# Patient Record
Sex: MALE | Race: WHITE | NOT HISPANIC OR LATINO | Employment: OTHER | URBAN - METROPOLITAN AREA
[De-identification: names, ages, dates, MRNs, and addresses within clinical notes are randomized per-mention and may not be internally consistent; named-entity substitution may affect disease eponyms.]

---

## 2017-02-13 ENCOUNTER — OFFICE VISIT (OUTPATIENT)
Dept: FAMILY MEDICINE | Facility: CLINIC | Age: 52
End: 2017-02-13

## 2017-02-13 VITALS
DIASTOLIC BLOOD PRESSURE: 83 MMHG | SYSTOLIC BLOOD PRESSURE: 134 MMHG | HEART RATE: 66 BPM | WEIGHT: 228 LBS | TEMPERATURE: 97.2 F | BODY MASS INDEX: 34.56 KG/M2 | OXYGEN SATURATION: 97 % | HEIGHT: 68 IN

## 2017-02-13 DIAGNOSIS — F90.9 ATTENTION DEFICIT HYPERACTIVITY DISORDER (ADHD), UNSPECIFIED ADHD TYPE: ICD-10-CM

## 2017-02-13 PROCEDURE — 99213 OFFICE O/P EST LOW 20 MIN: CPT | Performed by: FAMILY MEDICINE

## 2017-02-13 RX ORDER — DEXTROAMPHETAMINE SACCHARATE, AMPHETAMINE ASPARTATE, DEXTROAMPHETAMINE SULFATE AND AMPHETAMINE SULFATE 7.5; 7.5; 7.5; 7.5 MG/1; MG/1; MG/1; MG/1
30 TABLET ORAL DAILY
Qty: 30 TABLET | Refills: 0 | Status: SHIPPED | OUTPATIENT
Start: 2017-02-13 | End: 2018-05-01

## 2017-02-13 RX ORDER — DEXTROAMPHETAMINE SACCHARATE, AMPHETAMINE ASPARTATE, DEXTROAMPHETAMINE SULFATE AND AMPHETAMINE SULFATE 7.5; 7.5; 7.5; 7.5 MG/1; MG/1; MG/1; MG/1
30 TABLET ORAL DAILY
Qty: 30 TABLET | Refills: 0 | Status: SHIPPED | OUTPATIENT
Start: 2017-04-13 | End: 2018-05-04

## 2017-02-13 RX ORDER — DEXTROAMPHETAMINE SACCHARATE, AMPHETAMINE ASPARTATE, DEXTROAMPHETAMINE SULFATE AND AMPHETAMINE SULFATE 7.5; 7.5; 7.5; 7.5 MG/1; MG/1; MG/1; MG/1
30 TABLET ORAL DAILY
Qty: 30 TABLET | Refills: 0 | Status: SHIPPED | OUTPATIENT
Start: 2017-03-13 | End: 2018-05-01

## 2017-02-13 NOTE — MR AVS SNAPSHOT
"              After Visit Summary   2/13/2017    El Singh    MRN: [id number]           Patient Information     Date Of Birth          1965        Visit Information        Provider Department      2/13/2017 11:30 AM Jose Willson MD [facility]        Today's Diagnoses     Attention deficit hyperactivity disorder (ADHD), unspecified ADHD type          Care Instructions    Let me see you in 3-5 months for a physical with pre-visit labs.        Follow-ups after your visit        Who to contact     If you have questions or need follow up information about today's clinic visit or your schedule please contact [facility] directly at [phone].  Normal or non-critical lab and imaging results will be communicated to you by MyChart, letter or phone within 4 business days after the clinic has received the results. If you do not hear from us within 7 days, please contact the clinic through [employer] or phone. If you have a critical or abnormal lab result, we will notify you by phone as soon as possible.  Submit refill requests through [employer] or call your pharmacy and they will forward the refill request to us. Please allow 3 business days for your refill to be completed.          Additional Information About Your Visit        MyChart Information     [employer] gives you secure access to your electronic health record. If you see a primary care provider, you can also send messages to your care team and make appointments. If you have questions, please call your primary care clinic.  If you do not have a primary care provider, please call [phone] and they will assist you.        Care EveryWhere ID     This is your Care EveryWhere ID. This could be used by other organizations to access your [facility] medical records  [id number]        Your Vitals Were     Pulse Temperature Height Pulse Oximetry BMI (Body Mass Index)       66 97.2  F (36.2  C) (Oral) 5' 8.25\" (1.734 m) 97% 34.41 kg/m2  "       Blood Pressure from Last 3 Encounters:   02/13/17 134/83   01/21/16 128/86   11/11/15 122/88    Weight from Last 3 Encounters:   02/13/17 228 lb (103.4 kg)   01/21/16 217 lb (98.4 kg)   11/11/15 215 lb (97.5 kg)              Today, you had the following     No orders found for display         Where to get your medicines      Some of these will need a paper prescription and others can be bought over the counter.  Ask your nurse if you have questions.     Bring a paper prescription for each of these medications     amphetamine-dextroamphetamine 30 MG per tablet    amphetamine-dextroamphetamine 30 MG per tablet    amphetamine-dextroamphetamine 30 MG per tablet          Primary Care Provider Office Phone # Fax #    Jose Willson [phone]       [facility] [address] [facility] [id number]        Thank you!     Thank you for choosing [facility] [place]  for your care. Our goal is always to provide you with excellent care. Hearing back from our patients is one way we can continue to improve our services. Please take a few minutes to complete the written survey that you may receive in the mail after your visit with us. Thank you!             Your Updated Medication List - Protect others around you: Learn how to safely use, store and throw away your medicines at www.disposemymeds.org.          This list is accurate as of: 2/13/17 12:02 PM.  Always use your most recent med list.                   Brand Name Dispense Instructions for use    * amphetamine-dextroamphetamine 30 MG per tablet    ADDERALL    30 tablet    Take 1 tablet (30 mg) by mouth daily       * amphetamine-dextroamphetamine 30 MG per tablet   Start taking on:  3/13/2017    ADDERALL    30 tablet    Take 1 tablet (30 mg) by mouth daily       * amphetamine-dextroamphetamine 30 MG per tablet   Start taking on:  4/13/2017    ADDERALL    30 tablet    Take 1 tablet (30 mg) by mouth daily       MULTI-VITAMIN PO       Take  by mouth.       * Notice:  This list has 3 medication(s) that are the same as other medications prescribed for you. Read the directions carefully, and ask your doctor or other care provider to review them with you.

## 2017-02-13 NOTE — NURSING NOTE
"Chief Complaint   Patient presents with     A.D.H.D       Initial /83 (Cuff Size: Adult Large)  Pulse 66  Temp 97.2  F (36.2  C) (Oral)  Ht 5' 8.25\" (1.734 m)  Wt 228 lb (103.4 kg)  SpO2 97%  BMI 34.41 kg/m2 Estimated body mass index is 34.41 kg/(m^2) as calculated from the following:    Height as of this encounter: 5' 8.25\" (1.734 m).    Weight as of this encounter: 228 lb (103.4 kg).  Medication Reconciliation: complete    Radha Feliz LPN    "

## 2017-02-13 NOTE — PROGRESS NOTES
HPI:    Brent is a 52 year old male here for follow-up:    ADHD - dx is based on evaluation by Sarita Beaver LP on 3/11, 3/18 and 3/27/13. Symptoms are poor his attention, focus, easy distractibility, insomnia and mood swings.   Treatment:   Adderall 30 mg qd (we started out with XR but he preferred short acting) - has not been taking it lately.     Insomnia - stable on Melatonin prn.    Shoulder and hip pain - following with ortho. Injections and PT have been helpful.     Overweight now Obese - Previously declined nutrition referral. Discussed diet and exercise.    Body mass index is 34.41 kg/(m^2).      Wt Readings from Last 5 Encounters:   02/13/17 228 lb (103.4 kg)   01/21/16 217 lb (98.4 kg)   11/11/15 215 lb (97.5 kg)   08/13/15 215 lb (97.5 kg)   04/15/15 215 lb (97.5 kg)     Wt Readings from Last 5 Encounters:    08/13/15  215 lb (97.523 kg)    04/15/15  215 lb (97.523 kg)    09/15/14  218 lb (98.884 kg)    04/15/14  211 lb (95.709 kg)    11/26/13  209 lb (94.802 kg)      Family history of blood clots and factor V leiden mutation - see FH. No personal history of blood clots. Negative for factor V leiden mutation on 4/15/14. Takes ASA 81 mg qd.     Vitamin D insufficiency - Vit D 2/27/13 showed borderline low level. He is not consistent with taking over the counter Vit D 1000 units daily.     Preventive:     Immunization History   Administered Date(s) Administered     Influenza (IIV3) 09/21/2010     Influenza (intradermal) 03/06/2013     Influenza Vaccine IM 3yrs+ 4 Valent IIV4 11/26/2013     TD (ADULT, 7+) 01/16/2006     TDAP (ADACEL AGES 11-64) 03/06/2013     Lipids:   Recent Labs    Lab Test  04/15/15  0941  04/15/14  0952    CHOL  244*  234*    HDL  74  67    LDL  155*  147*    TRIG  77  101    CHOLHDLRATIO  3.3  3.5      Recent Labs     Lab Test   04/15/14  0952   02/27/13  0732     CHOL   234*   164     HDL   67   49     LDL   147*   99     TRIG   101   79     CHOLHDLRATIO   3.5   3.3       Diabetes  "screen:     Last Basic Metabolic Panel:   4/15/2015   POTASSIUM 4.0 4/15/2015  CHLORIDE 104 4/15/2015  PILI 9.0 4/15/2015  CO2 27 4/15/2015  BUN 17 4/15/2015  CR 0.86 4/15/2015  GLC 92 4/15/2015    Colonoscopy: 5/7/14 - repeat in 10 years  Hep C: Neg 4/15/14    STD screen - declines    Screening for prostate cancer - discussed previously controversy.    PSA   Date Value Ref Range Status   04/15/2015 0.92 0 - 4 ug/L Final       ROS:    Const: No fevers or night sweats recently.  ENT: No runny nose, sore throat or ear pain.  Resp: No cough or shortness of breath.  CV: No chest pain, dizziness or cardiac palpitations.  GI: No nausea, vomiting, diarrhea or constipation. Denies blood in stools or black stools.  : No dysuria, frequency or hematuria.      SH:    Marital status:   Kids: 4  Employment: construction and real estate  Exercise: no  Tobacco: no  Etoh: no  Recreational drugs: no  Caffeine: one per day    FH:    Dad had PE and had factor V leiden. Cousin had blood clot in her legs and had factor V leiden.     Exam:    /83 (Cuff Size: Adult Large)  Pulse 66  Temp 97.2  F (36.2  C) (Oral)  Ht 5' 8.25\" (1.734 m)  Wt 228 lb (103.4 kg)  SpO2 97%  BMI 34.41 kg/m2    Gen: Healthy appearing male in no acute distress  Psych: Affect is normal. Speech is fluent. Thought logical. Insight and judgement seem to be intact. Denies SI or HI.    Assessment and Plan - Decision Making    1. Attention deficit hyperactivity disorder (ADHD), unspecified ADHD type   checked and no concerns. Refilled Adderall.  - amphetamine-dextroamphetamine (ADDERALL) 30 MG per tablet; Take 1 tablet (30 mg) by mouth daily  Dispense: 30 tablet; Refill: 0  - amphetamine-dextroamphetamine (ADDERALL) 30 MG per tablet; Take 1 tablet (30 mg) by mouth daily  Dispense: 30 tablet; Refill: 0  - amphetamine-dextroamphetamine (ADDERALL) 30 MG per tablet; Take 1 tablet (30 mg) by mouth daily  Dispense: 30 tablet; Refill: 0      Written " instructions given as follows:    Patient Instructions   Let me see you in 3-5 months for a physical with pre-visit labs.

## 2018-05-04 ENCOUNTER — OFFICE VISIT (OUTPATIENT)
Dept: FAMILY MEDICINE | Facility: CLINIC | Age: 53
End: 2018-05-04
Payer: COMMERCIAL

## 2018-05-04 VITALS
WEIGHT: 225 LBS | TEMPERATURE: 97.8 F | SYSTOLIC BLOOD PRESSURE: 120 MMHG | DIASTOLIC BLOOD PRESSURE: 86 MMHG | HEART RATE: 67 BPM | BODY MASS INDEX: 34.1 KG/M2 | HEIGHT: 68 IN | RESPIRATION RATE: 14 BRPM | OXYGEN SATURATION: 97 %

## 2018-05-04 DIAGNOSIS — F90.9 ATTENTION DEFICIT HYPERACTIVITY DISORDER (ADHD), UNSPECIFIED ADHD TYPE: ICD-10-CM

## 2018-05-04 DIAGNOSIS — Z00.00 ROUTINE GENERAL MEDICAL EXAMINATION AT A HEALTH CARE FACILITY: Primary | ICD-10-CM

## 2018-05-04 LAB
ANION GAP SERPL CALCULATED.3IONS-SCNC: 7 MMOL/L (ref 3–14)
BUN SERPL-MCNC: 18 MG/DL (ref 7–30)
CALCIUM SERPL-MCNC: 8.7 MG/DL (ref 8.5–10.1)
CHLORIDE SERPL-SCNC: 106 MMOL/L (ref 94–109)
CHOLEST SERPL-MCNC: 206 MG/DL
CO2 SERPL-SCNC: 28 MMOL/L (ref 20–32)
CREAT SERPL-MCNC: 0.96 MG/DL (ref 0.66–1.25)
GFR SERPL CREATININE-BSD FRML MDRD: 82 ML/MIN/1.7M2
GLUCOSE SERPL-MCNC: 96 MG/DL (ref 70–99)
HDLC SERPL-MCNC: 64 MG/DL
LDLC SERPL CALC-MCNC: 125 MG/DL
NONHDLC SERPL-MCNC: 142 MG/DL
POTASSIUM SERPL-SCNC: 4.4 MMOL/L (ref 3.4–5.3)
PSA SERPL-ACNC: 0.93 UG/L (ref 0–4)
SODIUM SERPL-SCNC: 141 MMOL/L (ref 133–144)
TRIGL SERPL-MCNC: 87 MG/DL

## 2018-05-04 PROCEDURE — 80061 LIPID PANEL: CPT | Performed by: FAMILY MEDICINE

## 2018-05-04 PROCEDURE — 36415 COLL VENOUS BLD VENIPUNCTURE: CPT | Performed by: FAMILY MEDICINE

## 2018-05-04 PROCEDURE — 80048 BASIC METABOLIC PNL TOTAL CA: CPT | Performed by: FAMILY MEDICINE

## 2018-05-04 PROCEDURE — 99396 PREV VISIT EST AGE 40-64: CPT | Performed by: FAMILY MEDICINE

## 2018-05-04 PROCEDURE — G0103 PSA SCREENING: HCPCS | Performed by: FAMILY MEDICINE

## 2018-05-04 RX ORDER — DEXTROAMPHETAMINE SACCHARATE, AMPHETAMINE ASPARTATE, DEXTROAMPHETAMINE SULFATE AND AMPHETAMINE SULFATE 7.5; 7.5; 7.5; 7.5 MG/1; MG/1; MG/1; MG/1
30 TABLET ORAL DAILY
Qty: 30 TABLET | Refills: 0 | Status: SHIPPED | OUTPATIENT
Start: 2018-07-04 | End: 2019-10-29

## 2018-05-04 RX ORDER — DEXTROAMPHETAMINE SACCHARATE, AMPHETAMINE ASPARTATE, DEXTROAMPHETAMINE SULFATE AND AMPHETAMINE SULFATE 7.5; 7.5; 7.5; 7.5 MG/1; MG/1; MG/1; MG/1
30 TABLET ORAL DAILY
Qty: 30 TABLET | Refills: 0 | Status: SHIPPED | OUTPATIENT
Start: 2018-05-04 | End: 2019-10-29

## 2018-05-04 RX ORDER — DEXTROAMPHETAMINE SACCHARATE, AMPHETAMINE ASPARTATE, DEXTROAMPHETAMINE SULFATE AND AMPHETAMINE SULFATE 7.5; 7.5; 7.5; 7.5 MG/1; MG/1; MG/1; MG/1
30 TABLET ORAL DAILY
Qty: 30 TABLET | Refills: 0 | Status: SHIPPED | OUTPATIENT
Start: 2018-06-04 | End: 2019-10-29

## 2018-05-04 NOTE — MR AVS SNAPSHOT
After Visit Summary   5/4/2018    El Singh    MRN: [id number]           Patient Information     Date Of Birth          1965        Visit Information        Provider Department      5/4/2018 8:10 AM Jose Willson MD [facility]        Today's Diagnoses     Routine general medical examination at a health care facility    -  1    Attention deficit hyperactivity disorder (ADHD), unspecified ADHD type          Care Instructions    1. I recommend including veggies, fresh fruits (3 to 5 servings), nuts (unsalted) in your daily diet. Cut down on carbs like bread, pasta, rice, potatoes. Cut down on red meats like burgers etc. Increase healthy proteins like beans, tofu, tuna, chicken and turkey.    2. Get regular exercise like jogging, biking, swimming at least 3 times per week.      3. Avoid the sun or otherwise use sun screen - please look at the insert I gave you about melanoma.    4. See the dentist at least once per year. Eye doctor every 2 years.    5. I will contact you via My Chart about your test results.     6. If all is well, see you in one year for a physical with fasting labs.          Follow-ups after your visit        Who to contact     If you have questions or need follow up information about today's clinic visit or your schedule please contact [facility] directly at [phone].  Normal or non-critical lab and imaging results will be communicated to you by MyChart, letter or phone within 4 business days after the clinic has received the results. If you do not hear from us within 7 days, please contact the clinic through MyChart or phone. If you have a critical or abnormal lab result, we will notify you by phone as soon as possible.  Submit refill requests through [employer] or call your pharmacy and they will forward the refill request to us. Please allow 3 business days for your refill to be completed.          Additional Information About Your Visit    "     MyChart Information     [employer] gives you secure access to your electronic health record. If you see a primary care provider, you can also send messages to your care team and make appointments. If you have questions, please call your primary care clinic.  If you do not have a primary care provider, please call [phone] and they will assist you.        Care EveryWhere ID     This is your Care EveryWhere ID. This could be used by other organizations to access your [facility] medical records  [id number]        Your Vitals Were     Pulse Temperature Respirations Height Pulse Oximetry BMI (Body Mass Index)    67 97.8  F (36.6  C) (Oral) 14 5' 8.25\" (1.734 m) 97% 33.96 kg/m2       Blood Pressure from Last 3 Encounters:   05/04/18 120/86   02/13/17 134/83   01/21/16 128/86    Weight from Last 3 Encounters:   05/04/18 225 lb (102.1 kg)   02/13/17 228 lb (103.4 kg)   01/21/16 217 lb (98.4 kg)              We Performed the Following     Basic metabolic panel     Lipid panel reflex to direct LDL Fasting     Prostate spec antigen screen          Where to get your medicines      Some of these will need a paper prescription and others can be bought over the counter.  Ask your nurse if you have questions.     Bring a paper prescription for each of these medications     amphetamine-dextroamphetamine 30 MG per tablet    amphetamine-dextroamphetamine 30 MG per tablet    amphetamine-dextroamphetamine 30 MG per tablet          Primary Care Provider Office Phone # Fax #    Jose Willson [phone] [address] [address] [facility] [address]        Equal Access to Services     [facility]: Hadii aad ku hadasho Soomayra, waaxda luqadaha, qaybta kaalmada anton harris . So [facility] [place] [facility] [phone].    ATENCIÓN: Si habla español, tiene a wagoner disposición servicios gratuitos de asistencia lingüística. Llame al [phone].    We comply with applicable federal civil rights laws and " [place] laws. We do not discriminate on the basis of race, color, national origin, age, disability, sex, sexual orientation, or gender identity.            Thank you!     [facility]. Our goal is always to provide you with excellent care. Hearing back from our patients is one way we can continue to improve our services. Please take a few minutes to complete the written survey that you may receive in the mail after your visit with us. Thank you!             Your Updated Medication List - Protect others around you: Learn how to safely use, store and throw away your medicines at www.disposemymeds.org.          This list is accurate as of 5/4/18  8:59 AM.  Always use your most recent med list.                   Brand Name Dispense Instructions for use Diagnosis    * amphetamine-dextroamphetamine 30 MG per tablet    ADDERALL    30 tablet    Take 1 tablet (30 mg) by mouth daily    Attention deficit hyperactivity disorder (ADHD), unspecified ADHD type       * amphetamine-dextroamphetamine 30 MG per tablet   Start taking on:  6/4/2018    ADDERALL    30 tablet    Take 1 tablet (30 mg) by mouth daily    Attention deficit hyperactivity disorder (ADHD), unspecified ADHD type       * amphetamine-dextroamphetamine 30 MG per tablet   Start taking on:  7/4/2018    ADDERALL    30 tablet    Take 1 tablet (30 mg) by mouth daily    Attention deficit hyperactivity disorder (ADHD), unspecified ADHD type       MULTI-VITAMIN PO      Take  by mouth.        * Notice:  This list has 3 medication(s) that are the same as other medications prescribed for you. Read the directions carefully, and ask your doctor or other care provider to review them with you.

## 2018-05-04 NOTE — NURSING NOTE
"Chief Complaint   Patient presents with     Physical       Initial BP (!) 149/99 (Cuff Size: Adult Large)  Pulse 67  Temp 97.8  F (36.6  C) (Oral)  Resp 14  Ht 5' 8.25\" (1.734 m)  Wt 225 lb (102.1 kg)  SpO2 97%  BMI 33.96 kg/m2 Estimated body mass index is 33.96 kg/(m^2) as calculated from the following:    Height as of this encounter: 5' 8.25\" (1.734 m).    Weight as of this encounter: 225 lb (102.1 kg).      Radha Feliz LPN    "

## 2018-05-04 NOTE — PATIENT INSTRUCTIONS
1. I recommend including veggies, fresh fruits (3 to 5 servings), nuts (unsalted) in your daily diet. Cut down on carbs like bread, pasta, rice, potatoes. Cut down on red meats like burgers etc. Increase healthy proteins like beans, tofu, tuna, chicken and turkey.    2. Get regular exercise like jogging, biking, swimming at least 3 times per week.      3. Avoid the sun or otherwise use sun screen - please look at the insert I gave you about melanoma.    4. See the dentist at least once per year. Eye doctor every 2 years.    5. I will contact you via My Chart about your test results.     6. If all is well, see you in one year for a physical with fasting labs.

## 2018-05-04 NOTE — PROGRESS NOTES
SUBJECTIVE:   CC: El Singh is an 53 year old male who presents for preventative health visit.     Physical   Annual:     Getting at least 3 servings of Calcium per day::  NO    Bi-annual eye exam::  NO    Dental care twice a year::  Yes    Sleep apnea or symptoms of sleep apnea::  None    Frequency of exercise::  1 day/week    Duration of exercise::  15-30 minutes    Taking medications regularly::  Not Applicable    Additional concerns today::  YES            ADHD - Symptoms are poor his attention, focus, easy distractibility, insomnia and mood swings.   Evaluation and treatment:    dx is based on evaluation by [facility] on 3/11, 3/18 and 3/27/13.    Adderall 30 mg qd (we started out with XR but he preferred short acting) - takes it only as needed so it lasts him a long time.    Insomnia - stable on Melatonin prn.    Shoulder and hip pain - following with ortho. Injections and PT have been helpful.     Dyslipidemia - No history of CAD, CVA, PAD or diabetes.   Evaluation and treatment:    Not on any meds.   Check fasting lipids and go from there.    Recent Labs   Lab Test  04/15/15   0941  04/15/14   0952   CHOL  244*  234*   HDL  74  67   LDL  155*  147*   TRIG  77  101   CHOLHDLRATIO  3.3  3.5     Sleep apnea -   Evaluation and treatment:    He did not tolerate CPAP    Overweight now Obese -   Evaluation and treatment:    Previously declined nutrition referral.    Discussed diet and exercise.   He plans to start exercising.    Body mass index is 33.96 kg/(m^2).      Wt Readings from Last 5 Encounters:   05/04/18 225 lb (102.1 kg)   02/13/17 228 lb (103.4 kg)   01/21/16 217 lb (98.4 kg)   11/11/15 215 lb (97.5 kg)   08/13/15 215 lb (97.5 kg)     Wt Readings from Last 5 Encounters:    08/13/15  215 lb (97.523 kg)    04/15/15  215 lb (97.523 kg)    09/15/14  218 lb (98.884 kg)    04/15/14  211 lb (95.709 kg)    11/26/13  209 lb (94.802 kg)      Family history of blood clots and factor V leiden mutation -  see FH. No personal history of blood clots. Negative for factor V leiden mutation on 4/15/14. Takes ASA 81 mg qd.     Vitamin D insufficiency - Vit D 2/27/13 showed borderline low level. He is not consistent with taking over the counter Vit D 1000 units daily    comedone - on left lower lip. Present for over one month.  Evaluation and treatment:    I offered to remove using tip of a needle.   But he would rather observe it.    Preventive: advised to get Shingrix at our pharmacy.     Immunization History   Administered Date(s) Administered     Influenza (IIV3) PF 09/21/2010     Influenza (intradermal) 03/06/2013     Influenza Vaccine IM 3yrs+ 4 Valent IIV4 11/26/2013     TD (ADULT, 7+) 01/16/2006     TDAP Vaccine (Adacel) 03/06/2013     Diabetes screen: ordered today.    Colonoscopy: 5/7/14 - repeat in 10 years    Hep C: Neg 4/15/14    STD screen - declines    Screening for prostate cancer - discussed controversy. PSA ordered.    PSA   Date Value Ref Range Status   04/15/2015 0.92 0 - 4 ug/L Final       SH:    Marital status:   Kids: 4  Employment: construction and real estate  Exercise: no  Tobacco: no  Etoh: no  Recreational drugs: no  Caffeine: one per day    FH:    Dad had PE and had factor V leiden. Cousin had blood clot in her legs and had factor V leiden.       Today's PHQ-2 Score:   PHQ-2 ( 1999 Pf[employer]) 5/4/2018   Q1: Little interest or pleasure in doing things 0   Q2: Feeling down, depressed or hopeless 0   PHQ-2 Score 0   Q1: Little interest or pleasure in doing things Not at all   Q2: Feeling down, depressed or hopeless Not at all   PHQ-2 Score 0       Abuse: Current or Past(Physical, Sexual or Emotional)- No  Do you feel safe in your environment - Yes    Social History   Substance Use Topics     Smoking status: Never Smoker     Smokeless tobacco: Never Used     Alcohol use No      Comment: quit      Alcohol Use 5/4/2018   If you drink alcohol do you typically have greater than 3 drinks per  day OR greater than 7 drinks per week? Yes   AUDIT SCORE  5     AUDIT - Alcohol Use Disorders Identification Test - Reproduced from the World Health Organization Audit 2001 (Second Edition) 5/4/2018   1.  How often do you have a drink containing alcohol? 2 to 3 times a week   2.  How many drinks containing alcohol do you have on a typical day when you are drinking? 1 or 2   3.  How often do you have five or more drinks on one occasion? Less than monthly   4.  How often during the last year have you found that you were not able to stop drinking once you had started? Never   5.  How often during the last year have you failed to do what was normally expected of you because of drinking? Never   6.  How often during the last year have you needed a first drink in the morning to get yourself going after a heavy drinking session? Never   7.  How often during the last year have you had a feeling of guilt or remorse after drinking? Never   8.  How often during the last year have you been unable to remember what happened the night before because of your drinking? Less than monthly   9.  Have you or someone else been injured because of your drinking? No   10. Has a relative, friend, doctor or other health care worker been concerned about your drinking or suggested you cut down? No   TOTAL SCORE 5       Last PSA:   PSA   Date Value Ref Range Status   04/15/2015 0.92 0 - 4 ug/L Final           Review of Systems  C: NEGATIVE for fever, chills, change in weight  I: NEGATIVE for worrisome rashes, moles or lesions  E: NEGATIVE for vision changes or irritation  ENT: NEGATIVE for ear, mouth and throat problems  R: NEGATIVE for significant cough or SOB  CV: NEGATIVE for chest pain, palpitations or peripheral edema  GI: NEGATIVE for nausea, abdominal pain, heartburn, or change in bowel habits   male: negative for dysuria, hematuria, decreased urinary stream, erectile dysfunction, urethral discharge  M: NEGATIVE for significant  "arthralgias or myalgia  N: NEGATIVE for weakness, dizziness or paresthesias  P: NEGATIVE for changes in mood or affect    OBJECTIVE:   /86 (Cuff Size: Adult Large)  Pulse 67  Temp 97.8  F (36.6  C) (Oral)  Resp 14  Ht 5' 8.25\" (1.734 m)  Wt 225 lb (102.1 kg)  SpO2 97%  BMI 33.96 kg/m2    Physical Exam  GENERAL: healthy, alert and no distress  EYES: Eyes grossly normal to inspection, PERRL and conjunctivae and sclerae normal  HENT: ear canals and TM's normal, nose and mouth without ulcers or lesions  NECK: no adenopathy, no asymmetry, masses, or scars and thyroid normal to palpation  RESP: lungs clear to auscultation - no rales, rhonchi or wheezes  CV: regular rate and rhythm, normal S1 S2, no S3 or S4, no murmur, click or rub, no peripheral edema and peripheral pulses strong  ABDOMEN: soft, nontender, no hepatosplenomegaly, no masses and bowel sounds normal  MS: no gross musculoskeletal defects noted, no edema  SKIN: no suspicious lesions or rashes. Left lower lip with tiny 1 mm comedone.  NEURO: Normal strength and tone, mentation intact and speech normal  PSYCH: mentation appears normal, affect normal/bright    ASSESSMENT/PLAN:     COUNSELING:   Reviewed preventive health counseling, as reflected in patient instructions       Regular exercise       Healthy diet/nutrition     reports that he has never smoked. He has never used smokeless tobacco.    Estimated body mass index is 34.41 kg/(m^2) as calculated from the following:    Height as of 2/13/17: 5' 8.25\" (1.734 m).    Weight as of 2/13/17: 228 lb (103.4 kg).   Weight management plan: Discussed healthy diet and exercise guidelines and patient will follow up in 12 months in clinic to re-evaluate.    Assessment and Plan - Decision Making    1. Routine general medical examination at a health care facility    Results of today's exam given to patient verbally along with age appropriate preventive measures. Written instructions reviewed and handed to the " patient.    - Lipid panel reflex to direct LDL Fasting  - Prostate spec antigen screen  - Basic metabolic panel    2. Attention deficit hyperactivity disorder (ADHD), unspecified ADHD type  Per HPI  - amphetamine-dextroamphetamine (ADDERALL) 30 MG per tablet; Take 1 tablet (30 mg) by mouth daily  Dispense: 30 tablet; Refill: 0  - amphetamine-dextroamphetamine (ADDERALL) 30 MG per tablet; Take 1 tablet (30 mg) by mouth daily  Dispense: 30 tablet; Refill: 0  - amphetamine-dextroamphetamine (ADDERALL) 30 MG per tablet; Take 1 tablet (30 mg) by mouth daily  Dispense: 30 tablet; Refill: 0      Written instructions given as follows:    Patient Instructions   1. I recommend including veggies, fresh fruits (3 to 5 servings), nuts (unsalted) in your daily diet. Cut down on carbs like bread, pasta, rice, potatoes. Cut down on red meats like burgers etc. Increase healthy proteins like beans, tofu, tuna, chicken and turkey.    2. Get regular exercise like jogging, biking, swimming at least 3 times per week.      3. Avoid the sun or otherwise use sun screen - please look at the insert I gave you about melanoma.    4. See the dentist at least once per year. Eye doctor every 2 years.    5. I will contact you via My Chart about your test results.     6. If all is well, see you in one year for a physical with fasting labs.

## 2018-05-07 NOTE — PROGRESS NOTES
Cheryl Gallegos,    Your cholesterol was a bit high. Your estimated 10 year risk of a heart attack or stroke is 3.8%. Statin drugs are recommended at 7.5% or greater.     Therefore you don't need medications. But please double up your efforts in diet and exercise.    All other labs were fine.    Regards,    Jose Willson M.D.

## 2018-05-12 ENCOUNTER — NURSE TRIAGE (OUTPATIENT)
Dept: NURSING | Facility: CLINIC | Age: 53
End: 2018-05-12

## 2018-05-12 NOTE — TELEPHONE ENCOUNTER
Wife called ,  without  Pt called. Seen in  [facility] ED  @ 5/10/18  cut on foot and small cut to tendon and didn't get pain Rx in ED  . FNA advised to consult with Podiatrist on-call for provider that saw Pt in ED at [facility] or return to [facility]   ED to Wife  . Checked to see if his PCP Dr Willson was on call and he is not and don't see any information from Epic from [facility] ED ( no same computer system ).   .Gay Mckeon RN [place] [facility].

## 2019-05-20 ENCOUNTER — ANCILLARY PROCEDURE (OUTPATIENT)
Dept: GENERAL RADIOLOGY | Facility: CLINIC | Age: 54
End: 2019-05-20
Attending: FAMILY MEDICINE
Payer: COMMERCIAL

## 2019-05-20 ENCOUNTER — OFFICE VISIT (OUTPATIENT)
Dept: ORTHOPEDICS | Facility: CLINIC | Age: 54
End: 2019-05-20
Payer: COMMERCIAL

## 2019-05-20 VITALS — SYSTOLIC BLOOD PRESSURE: 142 MMHG | OXYGEN SATURATION: 96 % | HEART RATE: 82 BPM | DIASTOLIC BLOOD PRESSURE: 97 MMHG

## 2019-05-20 DIAGNOSIS — M54.50 ACUTE RIGHT-SIDED LOW BACK PAIN WITHOUT SCIATICA: ICD-10-CM

## 2019-05-20 DIAGNOSIS — M54.50 ACUTE RIGHT-SIDED LOW BACK PAIN WITHOUT SCIATICA: Primary | ICD-10-CM

## 2019-05-20 LAB
ALBUMIN UR-MCNC: NEGATIVE MG/DL
ANION GAP SERPL CALCULATED.3IONS-SCNC: 6 MMOL/L (ref 3–14)
APPEARANCE UR: CLEAR
BASOPHILS # BLD AUTO: 0.1 10E9/L (ref 0–0.2)
BASOPHILS NFR BLD AUTO: 0.9 %
BILIRUB UR QL STRIP: NEGATIVE
BUN SERPL-MCNC: 20 MG/DL (ref 7–30)
CALCIUM SERPL-MCNC: 9.3 MG/DL (ref 8.5–10.1)
CHLORIDE SERPL-SCNC: 106 MMOL/L (ref 94–109)
CO2 SERPL-SCNC: 27 MMOL/L (ref 20–32)
COLOR UR AUTO: YELLOW
CREAT SERPL-MCNC: 0.86 MG/DL (ref 0.66–1.25)
DIFFERENTIAL METHOD BLD: NORMAL
EOSINOPHIL # BLD AUTO: 0.2 10E9/L (ref 0–0.7)
EOSINOPHIL NFR BLD AUTO: 2.2 %
ERYTHROCYTE [DISTWIDTH] IN BLOOD BY AUTOMATED COUNT: 12.8 % (ref 10–15)
GFR SERPL CREATININE-BSD FRML MDRD: >90 ML/MIN/{1.73_M2}
GLUCOSE SERPL-MCNC: 100 MG/DL (ref 70–99)
GLUCOSE UR STRIP-MCNC: NEGATIVE MG/DL
HCT VFR BLD AUTO: 44.2 % (ref 40–53)
HGB BLD-MCNC: 14.8 G/DL (ref 13.3–17.7)
HGB UR QL STRIP: NEGATIVE
IMM GRANULOCYTES # BLD: 0 10E9/L (ref 0–0.4)
IMM GRANULOCYTES NFR BLD: 0.4 %
KETONES UR STRIP-MCNC: NEGATIVE MG/DL
LEUKOCYTE ESTERASE UR QL STRIP: NEGATIVE
LYMPHOCYTES # BLD AUTO: 2 10E9/L (ref 0.8–5.3)
LYMPHOCYTES NFR BLD AUTO: 29.9 %
MCH RBC QN AUTO: 30.3 PG (ref 26.5–33)
MCHC RBC AUTO-ENTMCNC: 33.5 G/DL (ref 31.5–36.5)
MCV RBC AUTO: 90 FL (ref 78–100)
MONOCYTES # BLD AUTO: 0.5 10E9/L (ref 0–1.3)
MONOCYTES NFR BLD AUTO: 7.1 %
MUCOUS THREADS #/AREA URNS LPF: PRESENT /LPF
NEUTROPHILS # BLD AUTO: 4 10E9/L (ref 1.6–8.3)
NEUTROPHILS NFR BLD AUTO: 59.5 %
NITRATE UR QL: NEGATIVE
NRBC # BLD AUTO: 0 10*3/UL
NRBC BLD AUTO-RTO: 0 /100
PH UR STRIP: 5 PH (ref 5–7)
PLATELET # BLD AUTO: 250 10E9/L (ref 150–450)
POTASSIUM SERPL-SCNC: 3.8 MMOL/L (ref 3.4–5.3)
RBC # BLD AUTO: 4.89 10E12/L (ref 4.4–5.9)
RBC #/AREA URNS AUTO: 1 /HPF (ref 0–2)
SODIUM SERPL-SCNC: 140 MMOL/L (ref 133–144)
SOURCE: ABNORMAL
SP GR UR STRIP: 1.02 (ref 1–1.03)
UROBILINOGEN UR STRIP-MCNC: 0 MG/DL (ref 0–2)
WBC # BLD AUTO: 6.7 10E9/L (ref 4–11)
WBC #/AREA URNS AUTO: 1 /HPF (ref 0–5)

## 2019-05-20 RX ORDER — PREDNISONE 20 MG/1
40 TABLET ORAL DAILY
Qty: 14 TABLET | Refills: 0 | Status: SHIPPED | OUTPATIENT
Start: 2019-05-20 | End: 2019-10-29

## 2019-05-20 RX ORDER — CYCLOBENZAPRINE HCL 5 MG
5-10 TABLET ORAL
Qty: 20 TABLET | Refills: 0 | Status: SHIPPED | OUTPATIENT
Start: 2019-05-20 | End: 2019-10-29

## 2019-05-20 ASSESSMENT — PAIN SCALES - GENERAL: PAINLEVEL: WORST PAIN (10)

## 2019-05-20 NOTE — PROGRESS NOTES
Subjective:   El Singh is a 54 year old male with a history of intermittent low back pain for the past 6 years who presents with acute right-sided back pain that radiates around to his right flank for the past 3 days.  He describes the pain as 10/10.  He believes symptoms began when golfing.  He has tried a old muscle relaxer for many years ago without any improvement.  He does not recall an exact injury when golfing but pain began afterwards.  Ibuprofen has not provided relief.  Coughing has caused worsening of the pain.  He states the pain radiates towards the back and occasionally down towards the thigh but not past the knee.  He has not noticed any lower extremity weakness or changes in bowel or bladder function.  He is otherwise healthy without history of diabetes or chronic medical conditions.  There is a family history of kidney stones but he has not experienced any personal kidney stones or blood in his urine.  No fevers.  He states the symptoms feel similar to the pain he has had in the past, albeit worse.  He is accompanied today by his wife.    Background:   Date of injury: None  Duration of symptoms: years intermittently; 3 days worsening  Mechanism of Injury: Chronic; Unknown   Intensity: 10/10  Aggravating factors: Walking, rotation, golf   Relieving Factors: Nothing- tried ibuprofen, muscle relaxers  Prior Evaluation: [facility] 6 years ago    PAST MEDICAL, SOCIAL, SURGICAL AND FAMILY HISTORY: He  has no past medical history on file.  He  has a past surgical history that includes OPEN RX NOSE FX+OPEN FIX SEPTUM; circumcision,clamp; removal of sperm duct(s); ENT surgery; and Colonoscopy (5/7/2014).  His family history includes Blood Disease in his father and another family member; Heart Disease in his father.  He reports that he has never smoked. He has never used smokeless tobacco. He reports that he does not drink alcohol or use drugs.    ALLERGIES: He has No Known  Allergies.    CURRENT MEDICATIONS: He has a current medication list which includes the following prescription(s): amphetamine-dextroamphetamine, amphetamine-dextroamphetamine, amphetamine-dextroamphetamine, and multiple vitamin.     REVIEW OF SYSTEMS: 9 point review of systems is negative except as noted above.     Exam:   BP (!) 142/97 (BP Location: Right arm, Patient Position: Sitting, Cuff Size: Adult Large)   Pulse 82   SpO2 96%            CONSTITUTIONAL: healthy, alert and no distress  HEAD: Normocephalic.   SKIN: no suspicious lesions or rashes  GAIT: normal  NEUROLOGIC: Non-focal  PSYCHIATRIC: affect normal/bright and mentation appears normal.  Abdomen: Soft nontender nondistended without mass or guarding    MUSCULOSKELETAL:   Lumbar spine: Normal appearance without scoliosis or obvious deformity or soft tissue swelling.  No significant tenderness palpation over the midline spine.  There is mild tenderness over the right paraspinal muscles and subsequent tenderness over the right ribs with radiation back towards the spine.  No obvious crepitus over the ribs or palpable deformity.  Very mild CVA tenderness.  Negative straight leg raise, positive slump on the right.  2+ bilateral patellar and Achilles reflexes.  Intact distal sensation and 5/5 lower extremity strength bilaterally including great toe extension    2 view x-rays of the lumbar spine was obtained today with the following findings:  - Mild disc height loss at L1-L2 and L5-S1.      Assessment/Plan:   #) right-sided low back pain without radiculopathy    -Findings reviewed with the patient as well as his wife as above including x-rays  -At this time, his symptoms are likely due to low back strain which may have occurred during recent golfing, that being said, the distribution and intensity of his pain warrants ruling out other causes such as infection or nephrolithiasis.  Therefore, we will obtain a CBC, BMP, and UA prior to the patient leaving  today.  -If labs are reassuring, I recommend management with prednisone 40 mg to be taken in the morning for inflammation and recommend this medication be taken with adequate food of water to protect from gastric irritation  -I also recommend Flexeril 5 to 10 mg to be taken at night for muscle spasm, sedation precautions were discussed  -Given his overall recurrent course of his low back pain over the past 6 years, we also discussed possible advanced imaging which the patient has wife are very interested in obtaining and therefore an MRI was also ordered to evaluate for upper lumbar degenerative disc disease or significant impingement  -If symptoms improve over the next 5 days, I anticipate ordering physical therapy  -Patient as well as his wife endorsed understanding and agreement with the above plan will follow-up with me on Thursday to discuss the MRI results    Kody Ho MD  Primary Care Sports Medicine, [facility]

## 2019-05-20 NOTE — LETTER
5/20/2019      RE: El Singh  [address] [address]        Subjective:   El Singh is a 54 year old male with a history of intermittent low back pain for the past 6 years who presents with acute right-sided back pain that radiates around to his right flank for the past 3 days.  He describes the pain as 10/10.  He believes symptoms began when golfing.  He has tried a old muscle relaxer for many years ago without any improvement.  He does not recall an exact injury when golfing but pain began afterwards.  Ibuprofen has not provided relief.  Coughing has caused worsening of the pain.  He states the pain radiates towards the back and occasionally down towards the thigh but not past the knee.  He has not noticed any lower extremity weakness or changes in bowel or bladder function.  He is otherwise healthy without history of diabetes or chronic medical conditions.  There is a family history of kidney stones but he has not experienced any personal kidney stones or blood in his urine.  No fevers.  He states the symptoms feel similar to the pain he has had in the past, albeit worse.  He is accompanied today by his wife.    Background:   Date of injury: None  Duration of symptoms: years intermittently; 3 days worsening  Mechanism of Injury: Chronic; Unknown   Intensity: 10/10  Aggravating factors: Walking, rotation, golf   Relieving Factors: Nothing- tried ibuprofen, muscle relaxers  Prior Evaluation: [facility]andro 6 years ago    PAST MEDICAL, SOCIAL, SURGICAL AND FAMILY HISTORY: He  has no past medical history on file.  He  has a past surgical history that includes OPEN RX NOSE FX+OPEN FIX SEPTUM; circumcision,clamp; removal of sperm duct(s); ENT surgery; and Colonoscopy (5/7/2014).  His family history includes Blood Disease in his father and another family member; Heart Disease in his father.  He reports that he has never smoked. He has never used smokeless tobacco. He  reports that he does not drink alcohol or use drugs.    ALLERGIES: He has No Known Allergies.    CURRENT MEDICATIONS: He has a current medication list which includes the following prescription(s): amphetamine-dextroamphetamine, amphetamine-dextroamphetamine, amphetamine-dextroamphetamine, and multiple vitamin.     REVIEW OF SYSTEMS: 9 point review of systems is negative except as noted above.     Exam:   BP (!) 142/97 (BP Location: Right arm, Patient Position: Sitting, Cuff Size: Adult Large)   Pulse 82   SpO2 96%            CONSTITUTIONAL: healthy, alert and no distress  HEAD: Normocephalic.   SKIN: no suspicious lesions or rashes  GAIT: normal  NEUROLOGIC: Non-focal  PSYCHIATRIC: affect normal/bright and mentation appears normal.  Abdomen: Soft nontender nondistended without mass or guarding    MUSCULOSKELETAL:   Lumbar spine: Normal appearance without scoliosis or obvious deformity or soft tissue swelling.  No significant tenderness palpation over the midline spine.  There is mild tenderness over the right paraspinal muscles and subsequent tenderness over the right ribs with radiation back towards the spine.  No obvious crepitus over the ribs or palpable deformity.  Very mild CVA tenderness.  Negative straight leg raise, positive slump on the right.  2+ bilateral patellar and Achilles reflexes.  Intact distal sensation and 5/5 lower extremity strength bilaterally including great toe extension    2 view x-rays of the lumbar spine was obtained today with the following findings:  - Mild disc height loss at L1-L2 and L5-S1.      Assessment/Plan:   #) right-sided low back pain without radiculopathy    -Findings reviewed with the patient as well as his wife as above including x-rays  -At this time, his symptoms are likely due to low back strain which may have occurred during recent golfing, that being said, the distribution and intensity of his pain warrants ruling out other causes such as infection or  nephrolithiasis.  Therefore, we will obtain a CBC, BMP, and UA prior to the patient leaving today.  -If labs are reassuring, I recommend management with prednisone 40 mg to be taken in the morning for inflammation and recommend this medication be taken with adequate food of water to protect from gastric irritation  -I also recommend Flexeril 5 to 10 mg to be taken at night for muscle spasm, sedation precautions were discussed  -Given his overall recurrent course of his low back pain over the past 6 years, we also discussed possible advanced imaging which the patient has wife are very interested in obtaining and therefore an MRI was also ordered to evaluate for upper lumbar degenerative disc disease or significant impingement  -If symptoms improve over the next 5 days, I anticipate ordering physical therapy  -Patient as well as his wife endorsed understanding and agreement with the above plan will follow-up with me on Thursday to discuss the MRI results    Kody Ho MD  Primary Care Sports Medicine, [facility]    Kody Ho MD

## 2019-05-22 ENCOUNTER — ANCILLARY PROCEDURE (OUTPATIENT)
Dept: MRI IMAGING | Facility: CLINIC | Age: 54
End: 2019-05-22
Attending: FAMILY MEDICINE
Payer: COMMERCIAL

## 2019-05-22 DIAGNOSIS — M54.50 ACUTE RIGHT-SIDED LOW BACK PAIN WITHOUT SCIATICA: ICD-10-CM

## 2019-05-23 ENCOUNTER — OFFICE VISIT (OUTPATIENT)
Dept: ORTHOPEDICS | Facility: CLINIC | Age: 54
End: 2019-05-23
Payer: COMMERCIAL

## 2019-05-23 VITALS — SYSTOLIC BLOOD PRESSURE: 139 MMHG | HEART RATE: 83 BPM | DIASTOLIC BLOOD PRESSURE: 94 MMHG

## 2019-05-23 DIAGNOSIS — M94.0 COSTOCHONDRITIS: Primary | ICD-10-CM

## 2019-05-23 RX ORDER — MELOXICAM 7.5 MG/1
15 TABLET ORAL DAILY
Qty: 28 TABLET | Refills: 0 | Status: SHIPPED | OUTPATIENT
Start: 2019-05-23 | End: 2019-10-29

## 2019-05-23 ASSESSMENT — PAIN SCALES - GENERAL: PAINLEVEL: EXTREME PAIN (8)

## 2019-05-23 NOTE — PROGRESS NOTES
Subjective:   El Singh is a 54 year old male with a history of intermittent low back pain for the past 6 years who presents for follow-up regarding back pain that radiates around to his right flank.  Symptoms have now been present for 5 to 7 days.  He believes initial injury occurred when golfing.  He has had similar symptoms in the past.  Symptoms are also exacerbated by coughing.  He states the pain begins over his back and radiates around his right rib to the right side of his anterior chest.  He denies any abdominal pain.  No history of kidney stones.  Prior labs including CBC, BMP, and urinalysis were normal.  He has not noticed notable improvement with Flexeril or rest.  He is unsure if the prednisone has helped.  He is here to discuss his MRI results.  He has not experienced any radicular or lower extremity symptoms.    Background:   Date of injury: None  Duration of symptoms: years intermittently; 3 days worsening  Mechanism of Injury: Chronic; Unknown   Intensity: 10/10  Aggravating factors: Walking, rotation, golf   Relieving Factors: Nothing- tried ibuprofen, muscle relaxers  Prior Evaluation: [facility]ctor[facility]jandro 6 years ago    PAST MEDICAL, SOCIAL, SURGICAL AND FAMILY HISTORY: He  has no past medical history on file.  He  has a past surgical history that includes OPEN RX NOSE FX+OPEN FIX SEPTUM; circumcision,clamp; removal of sperm duct(s); ENT surgery; and Colonoscopy (5/7/2014).  His family history includes Blood Disease in his father and another family member; Heart Disease in his father.  He reports that he has never smoked. He has never used smokeless tobacco. He reports that he does not drink alcohol or use drugs.    ALLERGIES: He has No Known Allergies.    CURRENT MEDICATIONS: He has a current medication list which includes the following prescription(s): amphetamine-dextroamphetamine, amphetamine-dextroamphetamine, amphetamine-dextroamphetamine, cyclobenzaprine, multiple vitamin, and  prednisone.     REVIEW OF SYSTEMS: 9 point review of systems is negative except as noted above.     Exam:   There were no vitals taken for this visit.           CONSTITUTIONAL: healthy, alert and no distress  HEAD: Normocephalic.   SKIN: no suspicious lesions or rashes  GAIT: normal  NEUROLOGIC: Non-focal  PSYCHIATRIC: affect normal/bright and mentation appears normal.  Abdomen: Soft nontender nondistended without mass or guarding    MUSCULOSKELETAL:   Lumbar spine: Normal appearance without scoliosis or obvious deformity or soft tissue swelling.  No significant tenderness palpation over the midline spine.  There is mild tenderness over the right paraspinal muscles and subsequent tenderness over the right ribs with radiation back towards the spine.      Ribs: Tenderness palpation over the 6th-7th ribs with point tenderness over the anterior costochondral junction which reproduces the patient's pain.  No obvious swelling or subluxation of the rib or rib deformity or crepitus.    MRI lumbar spine was obtained 5/22/2019 with the following findings:  Impression: Mild multilevel degenerative changes most notable at L5-S1  where the descending left S1 nerve root is abutted by a left central  disc protrusion, but there is no definite impingement.      Assessment/Plan:   #) Right sided back pain in setting of suspected costochondritis though occult rib subluxation cannot be excluded at this time    -Physical examination findings most consistent with costochondritis based on today's examination  -MRI results were reviewed but findings do not correlate with the patient's symptoms  -It appears his symptoms have now localized and appeared to radiate along the rib and patient has direct reproduction of his pain at the costochondral junction  -Therefore, I recommend the patient complete the course of prednisone as scheduled and following completion we will then begin scheduled NSAIDs for 2 weeks  -If patient does not have  improvement in his symptoms following this 2 to 3-week course of treatment, he will then follow-up with me at which time we will consider x-rays as well as possible advanced imaging of the ribs  -Patient states he will follow-up with his chiropractor as scheduled  -He will also begin formal physical therapy focusing on core strengthening as he has had recurrent low back pain both currently as well as in the past.      Patient and his wife endorsed understanding and agreement with the above plan    Kody Ho MD  Primary Care Sports Medicine, [facility]    Greater than 25 minutes was spent with patient, of which, greater than 50% was spent in counseling and coordination of care.

## 2019-05-23 NOTE — LETTER
5/23/2019      RE: El Singh  [address] [facility] [address]        Subjective:   El Singh is a 54 year old male with a history of intermittent low back pain for the past 6 years who presents for follow-up regarding back pain that radiates around to his right flank.  Symptoms have now been present for 5 to 7 days.  He believes initial injury occurred when golfing.  He has had similar symptoms in the past.  Symptoms are also exacerbated by coughing.  He states the pain begins over his back and radiates around his right rib to the right side of his anterior chest.  He denies any abdominal pain.  No history of kidney stones.  Prior labs including CBC, BMP, and urinalysis were normal.  He has not noticed notable improvement with Flexeril or rest.  He is unsure if the prednisone has helped.  He is here to discuss his MRI results.  He has not experienced any radicular or lower extremity symptoms.    Background:   Date of injury: None  Duration of symptoms: years intermittently; 3 days worsening  Mechanism of Injury: Chronic; Unknown   Intensity: 10/10  Aggravating factors: Walking, rotation, golf   Relieving Factors: Nothing- tried ibuprofen, muscle relaxers  Prior Evaluation: [facility]ractor[facility]jandro 6 years ago    PAST MEDICAL, SOCIAL, SURGICAL AND FAMILY HISTORY: He  has no past medical history on file.  He  has a past surgical history that includes OPEN RX NOSE FX+OPEN FIX SEPTUM; circumcision,clamp; removal of sperm duct(s); ENT surgery; and Colonoscopy (5/7/2014).  His family history includes Blood Disease in his father and another family member; Heart Disease in his father.  He reports that he has never smoked. He has never used smokeless tobacco. He reports that he does not drink alcohol or use drugs.    ALLERGIES: He has No Known Allergies.    CURRENT MEDICATIONS: He has a current medication list which includes the following prescription(s): amphetamine-dextroamphetamine,  amphetamine-dextroamphetamine, amphetamine-dextroamphetamine, cyclobenzaprine, multiple vitamin, and prednisone.     REVIEW OF SYSTEMS: 9 point review of systems is negative except as noted above.     Exam:   There were no vitals taken for this visit.           CONSTITUTIONAL: healthy, alert and no distress  HEAD: Normocephalic.   SKIN: no suspicious lesions or rashes  GAIT: normal  NEUROLOGIC: Non-focal  PSYCHIATRIC: affect normal/bright and mentation appears normal.  Abdomen: Soft nontender nondistended without mass or guarding    MUSCULOSKELETAL:   Lumbar spine: Normal appearance without scoliosis or obvious deformity or soft tissue swelling.  No significant tenderness palpation over the midline spine.  There is mild tenderness over the right paraspinal muscles and subsequent tenderness over the right ribs with radiation back towards the spine.      Ribs: Tenderness palpation over the 6th-7th ribs with point tenderness over the anterior costochondral junction which reproduces the patient's pain.  No obvious swelling or subluxation of the rib or rib deformity or crepitus.    MRI lumbar spine was obtained 5/22/2019 with the following findings:  Impression: Mild multilevel degenerative changes most notable at L5-S1  where the descending left S1 nerve root is abutted by a left central  disc protrusion, but there is no definite impingement.      Assessment/Plan:   #) Right sided back pain in setting of suspected costochondritis though occult rib subluxation cannot be excluded at this time    -Physical examination findings most consistent with costochondritis based on today's examination  -MRI results were reviewed but findings do not correlate with the patient's symptoms  -It appears his symptoms have now localized and appeared to radiate along the rib and patient has direct reproduction of his pain at the costochondral junction  -Therefore, I recommend the patient complete the course of prednisone as scheduled and  following completion we will then begin scheduled NSAIDs for 2 weeks  -If patient does not have improvement in his symptoms following this 2 to 3-week course of treatment, he will then follow-up with me at which time we will consider x-rays as well as possible advanced imaging of the ribs  -Patient states he will follow-up with his chiropractor as scheduled  -He will also begin formal physical therapy focusing on core strengthening as he has had recurrent low back pain both currently as well as in the past.      Patient and his wife endorsed understanding and agreement with the above plan    Kody Ho MD  Primary Care Sports Medicine, [facility]    Greater than 25 minutes was spent with patient, of which, greater than 50% was spent in counseling and coordination of care.     Kody Ho MD

## 2019-05-28 ENCOUNTER — THERAPY VISIT (OUTPATIENT)
Dept: PHYSICAL THERAPY | Facility: CLINIC | Age: 54
End: 2019-05-28
Payer: COMMERCIAL

## 2019-05-28 DIAGNOSIS — M54.9 BACK PAIN: Primary | ICD-10-CM

## 2019-05-28 PROCEDURE — 97530 THERAPEUTIC ACTIVITIES: CPT | Mod: GP | Performed by: PHYSICAL THERAPIST

## 2019-05-28 PROCEDURE — 97161 PT EVAL LOW COMPLEX 20 MIN: CPT | Mod: GP | Performed by: PHYSICAL THERAPIST

## 2019-05-28 PROCEDURE — 97110 THERAPEUTIC EXERCISES: CPT | Mod: GP | Performed by: PHYSICAL THERAPIST

## 2019-05-28 NOTE — PROGRESS NOTES
[facility] Initial Evaluation  Subjective:  The history is provided by the patient. No  was used.   El Singh is a 54 year old male with a lumbar and thoracic condition.  Condition occurred with:  Repetition/overuse and twisting.  Condition occurred: for unknown reasons.  This is a chronic condition     Patient reports pain:  Mid thoracic spine.  Radiates to:  No radiation.  Pain is described as aching and sharp and is constant and reported as 6/10 (varied levels).  Associated symptoms:  Loss of motion. Pain is worse during the day.  Symptoms are exacerbated by certain positions, walking, standing and lying down and relieved by ice, rest, heat and muscle relaxants.  Since onset symptoms are gradually improving.  Special tests:  MRI and x-ray.  Previous treatment includes chiropractic.  There was mild improvement following previous treatment.  General health as reported by patient is good.                                              Objective:  System         Lumbar/SI Evaluation  ROM:    AROM Lumbar:   Flexion:          Wnl, no pain  Ext:                    Min, barry LBP, glutes   Side Bend:        Left:  Pain    Right:  Wnl, -  Rotation:           Left:     Right:   Side Glide:        Left:     Right:       AROM Thoracic:  Flex:              Ext:                 Rotation:        Left:  Decreased, +    Right:  Wnl, -       Lumbar Myotomes:    T12-L3 (Hip Flex):  Left: 5    Right: 5  L2-4 (Quads):  Left:  5    Right:  5  L4 (Ankle DF):  Left:  5    Right:  5  L5 (Great Toe Ext): Left: 5    Right: 5   S1 (Toe Raise):  Left: 5    Right: 5  Lumbar DTR's:  not assessed          Neural Tension/Mobility:      Left side:Slump  negative.     Right side:   Slump  negative.                                                        General     ROS    Assessment/Plan:    Patient is a 54 year old male with thoracic and lumbar complaints.    Patient has the following significant  findings with corresponding treatment plan.                Diagnosis 1:  Back pain  Pain -  hot/cold therapy  Decreased ROM/flexibility - manual therapy and therapeutic exercise  Decreased strength - therapeutic exercise and therapeutic activities    Therapy Evaluation Codes:   1) History comprised of:   Personal factors that impact the plan of care:      None.    Comorbidity factors that impact the plan of care are:      None.     Medications impacting care: Anti-inflammatory.  2) Examination of Body Systems comprised of:   Body structures and functions that impact the plan of care:      Lumbar spine and Thoracic Spine.   Activity limitations that impact the plan of care are:      Lifting, Running, Sports, Standing, Walking and Laying down.  3) Clinical presentation characteristics are:   Stable/Uncomplicated.  4) Decision-Making    Low complexity using standardized patient assessment instrument and/or measureable assessment of functional outcome.  Cumulative Therapy Evaluation is: Low complexity.    Previous and current functional limitations:  (See Goal Flow Sheet for this information)    Short term and Long term goals: (See Goal Flow Sheet for this information)     Communication ability:  Patient appears to be able to clearly communicate and understand verbal and written communication and follow directions correctly.  Treatment Explanation - The following has been discussed with the patient:   RX ordered/plan of care  Anticipated outcomes  Possible risks and side effects  This patient would benefit from PT intervention to resume normal activities.   Rehab potential is good.    Frequency:  1 X week, once daily  Duration:  for 6 weeks  Discharge Plan:  Achieve all LTG.  Independent in home treatment program.  Reach maximal therapeutic benefit.    Please refer to the daily flowsheet for treatment today, total treatment time and time spent performing 1:1 timed codes.

## 2019-10-17 ENCOUNTER — DOCUMENTATION ONLY (OUTPATIENT)
Dept: LAB | Facility: CLINIC | Age: 54
End: 2019-10-17

## 2019-10-17 DIAGNOSIS — Z00.00 ROUTINE GENERAL MEDICAL EXAMINATION AT A HEALTH CARE FACILITY: Primary | ICD-10-CM

## 2019-10-17 NOTE — PROGRESS NOTES
Patient has an upcoming previsit appointment on 10/22/2019. Please review pended orders and add additional orders if needed.     Thank you,   Courtney Fuentes

## 2019-10-22 DIAGNOSIS — Z00.00 ROUTINE GENERAL MEDICAL EXAMINATION AT A HEALTH CARE FACILITY: ICD-10-CM

## 2019-10-22 LAB
ANION GAP SERPL CALCULATED.3IONS-SCNC: 4 MMOL/L (ref 3–14)
BUN SERPL-MCNC: 17 MG/DL (ref 7–30)
CALCIUM SERPL-MCNC: 8.9 MG/DL (ref 8.5–10.1)
CHLORIDE SERPL-SCNC: 106 MMOL/L (ref 94–109)
CHOLEST SERPL-MCNC: 237 MG/DL
CO2 SERPL-SCNC: 29 MMOL/L (ref 20–32)
CREAT SERPL-MCNC: 0.85 MG/DL (ref 0.66–1.25)
GFR SERPL CREATININE-BSD FRML MDRD: >90 ML/MIN/{1.73_M2}
GLUCOSE SERPL-MCNC: 96 MG/DL (ref 70–99)
HDLC SERPL-MCNC: 64 MG/DL
LDLC SERPL CALC-MCNC: 155 MG/DL
NONHDLC SERPL-MCNC: 173 MG/DL
POTASSIUM SERPL-SCNC: 4.3 MMOL/L (ref 3.4–5.3)
SODIUM SERPL-SCNC: 139 MMOL/L (ref 133–144)
TRIGL SERPL-MCNC: 91 MG/DL

## 2019-10-22 PROCEDURE — 80061 LIPID PANEL: CPT | Performed by: FAMILY MEDICINE

## 2019-10-22 PROCEDURE — 36415 COLL VENOUS BLD VENIPUNCTURE: CPT | Performed by: FAMILY MEDICINE

## 2019-10-22 PROCEDURE — 80048 BASIC METABOLIC PNL TOTAL CA: CPT | Performed by: FAMILY MEDICINE

## 2019-10-29 ENCOUNTER — OFFICE VISIT (OUTPATIENT)
Dept: FAMILY MEDICINE | Facility: CLINIC | Age: 54
End: 2019-10-29
Payer: COMMERCIAL

## 2019-10-29 VITALS
TEMPERATURE: 98 F | DIASTOLIC BLOOD PRESSURE: 78 MMHG | HEIGHT: 68 IN | BODY MASS INDEX: 34.4 KG/M2 | HEART RATE: 87 BPM | SYSTOLIC BLOOD PRESSURE: 138 MMHG | WEIGHT: 227 LBS | OXYGEN SATURATION: 100 %

## 2019-10-29 DIAGNOSIS — B35.1 ONYCHOMYCOSIS WITH INGROWN TOENAIL: ICD-10-CM

## 2019-10-29 DIAGNOSIS — F90.9 ATTENTION DEFICIT HYPERACTIVITY DISORDER (ADHD), UNSPECIFIED ADHD TYPE: ICD-10-CM

## 2019-10-29 DIAGNOSIS — L60.0 ONYCHOMYCOSIS WITH INGROWN TOENAIL: ICD-10-CM

## 2019-10-29 DIAGNOSIS — N52.9 ERECTILE DYSFUNCTION, UNSPECIFIED ERECTILE DYSFUNCTION TYPE: ICD-10-CM

## 2019-10-29 DIAGNOSIS — Z00.00 ROUTINE GENERAL MEDICAL EXAMINATION AT A HEALTH CARE FACILITY: Primary | ICD-10-CM

## 2019-10-29 PROCEDURE — 99213 OFFICE O/P EST LOW 20 MIN: CPT | Mod: 25 | Performed by: FAMILY MEDICINE

## 2019-10-29 PROCEDURE — 99396 PREV VISIT EST AGE 40-64: CPT | Performed by: FAMILY MEDICINE

## 2019-10-29 RX ORDER — DEXTROAMPHETAMINE SACCHARATE, AMPHETAMINE ASPARTATE, DEXTROAMPHETAMINE SULFATE AND AMPHETAMINE SULFATE 7.5; 7.5; 7.5; 7.5 MG/1; MG/1; MG/1; MG/1
30 TABLET ORAL DAILY
Qty: 30 TABLET | Refills: 0 | Status: SHIPPED | OUTPATIENT
Start: 2019-11-29 | End: 2021-04-27

## 2019-10-29 RX ORDER — DEXTROAMPHETAMINE SACCHARATE, AMPHETAMINE ASPARTATE, DEXTROAMPHETAMINE SULFATE AND AMPHETAMINE SULFATE 7.5; 7.5; 7.5; 7.5 MG/1; MG/1; MG/1; MG/1
30 TABLET ORAL DAILY
Qty: 30 TABLET | Refills: 0 | Status: SHIPPED | OUTPATIENT
Start: 2019-12-29 | End: 2021-06-04

## 2019-10-29 RX ORDER — SILDENAFIL CITRATE 20 MG/1
20 TABLET ORAL 3 TIMES DAILY
Qty: 30 TABLET | Refills: 11 | Status: SHIPPED | OUTPATIENT
Start: 2019-10-29 | End: 2019-10-29

## 2019-10-29 RX ORDER — SILDENAFIL CITRATE 20 MG/1
TABLET ORAL
Qty: 30 TABLET | Refills: 11 | Status: SHIPPED | OUTPATIENT
Start: 2019-10-29 | End: 2021-04-27

## 2019-10-29 RX ORDER — DEXTROAMPHETAMINE SACCHARATE, AMPHETAMINE ASPARTATE, DEXTROAMPHETAMINE SULFATE AND AMPHETAMINE SULFATE 7.5; 7.5; 7.5; 7.5 MG/1; MG/1; MG/1; MG/1
30 TABLET ORAL DAILY
Qty: 30 TABLET | Refills: 0 | Status: SHIPPED | OUTPATIENT
Start: 2019-10-29 | End: 2021-04-27

## 2019-10-29 ASSESSMENT — ENCOUNTER SYMPTOMS
HEMATOCHEZIA: 0
ABDOMINAL PAIN: 0
FEVER: 0
CONSTIPATION: 0
DIZZINESS: 0
CHILLS: 0
COUGH: 0
NERVOUS/ANXIOUS: 0
HEMATURIA: 0
EYE PAIN: 0
DIARRHEA: 0
FREQUENCY: 0

## 2019-10-29 ASSESSMENT — MIFFLIN-ST. JEOR: SCORE: 1843.14

## 2019-10-29 NOTE — PROGRESS NOTES
sildSUBJECTIVE:   CC: El Singh is an 54 year old male who presents for preventative health visit.     Healthy Habits:     Getting at least 3 servings of Calcium per day:  Yes    Bi-annual eye exam:  Yes    Dental care twice a year:  Yes    Sleep apnea or symptoms of sleep apnea:  None    Diet:  Regular (no restrictions)    Frequency of exercise:  2-3 days/week    Duration of exercise:  30-45 minutes    Taking medications regularly:  Yes    Medication side effects:  None    PHQ-2 Total Score: 0    Additional concerns today:  No    [place] in [place] -    ADHD - Symptoms are poor his attention, focus, easy distractibility, insomnia and mood swings.   Evaluation and treatment:    dx is based on evaluation by [facility] on 3/11, 3/18 and 3/27/13.    Adderall 30 mg qd (we started out with XR but he preferred short acting) - takes it only as needed so it lasts him a long time.    Insomnia - stable on Melatonin prn.    Shoulder and hip pain - following with ortho. Injections and PT have been helpful.     Dyslipidemia - No history of CAD, CVA, PAD or diabetes.   Evaluation and treatment:    Not on any meds.   Diet and exercise discussed.    Recent Labs   Lab Test 10/22/19  0857 05/04/18  0907 04/15/15  0941 04/15/14  0952   CHOL 237* 206* 244* 234*   HDL 64 64 74 67   * 125* 155* 147*   TRIG 91 87 77 101   CHOLHDLRATIO  --   --  3.3 3.5     The 10-year ASCVD risk score (Adrianalatanya SUE Jr., et al., 2013) is: 6.2%    Values used to calculate the score:      Age: 54 years      Sex: Male      Is Non- : No      Diabetic: No      Tobacco smoker: No      Systolic Blood Pressure: 138 mmHg      Is BP treated: No      HDL Cholesterol: 64 mg/dL      Total Cholesterol: 237 mg/dL    Sleep apnea -   Evaluation and treatment:    He did not tolerate CPAP    Obesity -   Evaluation and treatment:    Previously declined nutrition referral.    Discussed diet and exercise.   He plans to start  exercising.    Body mass index is 34.26 kg/m .      Wt Readings from Last 5 Encounters:   10/29/19 103 kg (227 lb)   05/04/18 102.1 kg (225 lb)   02/13/17 103.4 kg (228 lb)   01/21/16 98.4 kg (217 lb)   11/11/15 97.5 kg (215 lb)     Wt Readings from Last 5 Encounters:    08/13/15  215 lb (97.523 kg)    04/15/15  215 lb (97.523 kg)    09/15/14  218 lb (98.884 kg)    04/15/14  211 lb (95.709 kg)    11/26/13  209 lb (94.802 kg)      Family history of blood clots and factor V leiden mutation - see FH. No personal history of blood clots. Negative for factor V leiden mutation on 4/15/14. Takes ASA 81 mg qd.     Vitamin D insufficiency - Vit D 2/27/13 showed borderline low level. He is not consistent with taking over the counter Vit D 1000 units daily    ED - erections have been poor for a while. They are unreliable. Libido is fine.  Evaluation and treatment:    No contraindications to PDE5 inhibitors.   I asked him to try generic Sildenafil - side effects discussed.    Onychomycosis - on great toes.  Evaluation and treatment:    Referred to podiatry.    Preventive: declines flu shot. Advised to get Shingrix at our pharmacy.    Immunization History   Administered Date(s) Administered     Influenza (IIV3) PF 09/21/2010     Influenza (intradermal) 03/06/2013     Influenza Vaccine IM > 6 months Valent IIV4 11/26/2013     TD (ADULT, 7+) 01/16/2006     TDAP Vaccine (Adacel) 03/06/2013     Diabetes screen:     Last Comprehensive Metabolic Panel:  Sodium   Date Value Ref Range Status   10/22/2019 139 133 - 144 mmol/L Final     Potassium   Date Value Ref Range Status   10/22/2019 4.3 3.4 - 5.3 mmol/L Final     Chloride   Date Value Ref Range Status   10/22/2019 106 94 - 109 mmol/L Final     Carbon Dioxide   Date Value Ref Range Status   10/22/2019 29 20 - 32 mmol/L Final     Anion Gap   Date Value Ref Range Status   10/22/2019 4 3 - 14 mmol/L Final     Glucose   Date Value Ref Range Status   10/22/2019 96 70 - 99 mg/dL Final      Comment:     Fasting specimen     Urea Nitrogen   Date Value Ref Range Status   10/22/2019 17 7 - 30 mg/dL Final     Creatinine   Date Value Ref Range Status   10/22/2019 0.85 0.66 - 1.25 mg/dL Final     GFR Estimate   Date Value Ref Range Status   10/22/2019 >90 >60 mL/min/[1.73_m2] Final     Comment:     Non  GFR Calc  Starting 12/18/2018, serum creatinine based estimated GFR (eGFR) will be   calculated using the Chronic Kidney Disease Epidemiology Collaboration   (CKD-EPI) equation.       Calcium   Date Value Ref Range Status   10/22/2019 8.9 8.5 - 10.1 mg/dL Final     Colonoscopy: 5/7/14 - repeat in 10 years    Hep C: Neg 4/15/14    STD screen - declines    Screening for prostate cancer - discussed controversy.    PSA   Date Value Ref Range Status   05/04/2018 0.93 0 - 4 ug/L Final     Comment:     Assay Method:  Chemiluminescence using Siemens Vista analyzer     Advanced directive: he will send a copy from home.    SH:    Marital status:   Kids: 4  Employment: construction and real estate  Exercise: golfing, walking  Tobacco: no  Etoh: 6-8 per week  Recreational drugs: no  Caffeine: one per day    FH:    Dad had PE and had factor V leiden. Cousin had blood clot in her legs and had factor V leiden.       Today's PHQ-2 Score:   PHQ-2 ( 1999 Pfizer) 10/29/2019   Q1: Little interest or pleasure in doing things 0   Q2: Feeling down, depressed or hopeless 0   PHQ-2 Score 0   Q1: Little interest or pleasure in doing things Not at all   Q2: Feeling down, depressed or hopeless Not at all   PHQ-2 Score 0       Abuse: Current or Past(Physical, Sexual or Emotional)- No  Do you feel safe in your environment - Yes    Social History     Tobacco Use     Smoking status: Never Smoker     Smokeless tobacco: Never Used   Substance Use Topics     Alcohol use: No     Comment: quit      Alcohol Use 10/29/2019   Prescreen: >3 drinks/day or >7 drinks/week? No   Prescreen: >3 drinks/day or >7 drinks/week? -    AUDIT SCORE  -     AUDIT - Alcohol Use Disorders Identification Test - Reproduced from the World Health Organization Audit 2001 (Second Edition) 5/4/2018   1.  How often do you have a drink containing alcohol? 2 to 3 times a week   2.  How many drinks containing alcohol do you have on a typical day when you are drinking? 1 or 2   3.  How often do you have five or more drinks on one occasion? Less than monthly   4.  How often during the last year have you found that you were not able to stop drinking once you had started? Never   5.  How often during the last year have you failed to do what was normally expected of you because of drinking? Never   6.  How often during the last year have you needed a first drink in the morning to get yourself going after a heavy drinking session? Never   7.  How often during the last year have you had a feeling of guilt or remorse after drinking? Never   8.  How often during the last year have you been unable to remember what happened the night before because of your drinking? Less than monthly   9.  Have you or someone else been injured because of your drinking? No   10. Has a relative, friend, doctor or other health care worker been concerned about your drinking or suggested you cut down? No   TOTAL SCORE 5       Last PSA:   PSA   Date Value Ref Range Status   05/04/2018 0.93 0 - 4 ug/L Final     Comment:     Assay Method:  Chemiluminescence using Siemens Vista analyzer           Review of Systems  C: NEGATIVE for fever, chills, change in weight  I: NEGATIVE for worrisome rashes, moles or lesions  E: NEGATIVE for vision changes or irritation  ENT: NEGATIVE for ear, mouth and throat problems  R: NEGATIVE for significant cough or SOB  CV: NEGATIVE for chest pain, palpitations or peripheral edema  GI: NEGATIVE for nausea, abdominal pain, heartburn, or change in bowel habits   male: negative for dysuria, hematuria, decreased urinary stream, erectile dysfunction, urethral  "discharge  M: NEGATIVE for significant arthralgias or myalgia  N: NEGATIVE for weakness, dizziness or paresthesias  P: NEGATIVE for changes in mood or affect    OBJECTIVE:   BP (!) 153/77 (Cuff Size: Adult Large)   Pulse 87   Temp 98  F (36.7  C) (Oral)   Ht 1.734 m (5' 8.25\")   Wt 103 kg (227 lb)   SpO2 100%   BMI 34.26 kg/m      Physical Exam  GENERAL: healthy, alert and no distress  EYES: Eyes grossly normal to inspection, PERRL and conjunctivae and sclerae normal  HENT: ear canals and TM's normal, nose and mouth without ulcers or lesions  NECK: no adenopathy, no asymmetry, masses, or scars and thyroid normal to palpation  RESP: lungs clear to auscultation - no rales, rhonchi or wheezes  CV: regular rate and rhythm, normal S1 S2, no S3 or S4, no murmur, click or rub, no peripheral edema and peripheral pulses strong  ABDOMEN: soft, nontender, no hepatosplenomegaly, no masses and bowel sounds normal  MS: no gross musculoskeletal defects noted, no edema  SKIN: no suspicious lesions or rashes. Left lower lip with tiny 1 mm comedone.  NEURO: Normal strength and tone, mentation intact and speech normal  PSYCH: mentation appears normal, affect normal/bright    ASSESSMENT/PLAN:     COUNSELING:   Reviewed preventive health counseling, as reflected in patient instructions       Regular exercise       Healthy diet/nutrition     reports that he has never smoked. He has never used smokeless tobacco.    Estimated body mass index is 34.26 kg/m  as calculated from the following:    Height as of this encounter: 1.734 m (5' 8.25\").    Weight as of this encounter: 103 kg (227 lb).   Weight management plan: Discussed healthy diet and exercise guidelines and patient will follow up in 12 months in clinic to re-evaluate.    Assessment and Plan - Decision Making    1. Routine general medical examination at a health care facility    Results of today's exam given to patient verbally along with age appropriate preventive measures. " Written instructions reviewed and handed to the patient.    - Lipid panel reflex to direct LDL Fasting  - Prostate spec antigen screen  - Basic metabolic panel    2. Attention deficit hyperactivity disorder (ADHD), unspecified ADHD type  Per HPI  - amphetamine-dextroamphetamine (ADDERALL) 30 MG per tablet; Take 1 tablet (30 mg) by mouth daily  Dispense: 30 tablet; Refill: 0  - amphetamine-dextroamphetamine (ADDERALL) 30 MG per tablet; Take 1 tablet (30 mg) by mouth daily  Dispense: 30 tablet; Refill: 0  - amphetamine-dextroamphetamine (ADDERALL) 30 MG per tablet; Take 1 tablet (30 mg) by mouth daily  Dispense: 30 tablet; Refill: 0      Written instructions given as follows:        Today's PHQ-2 Score:   PHQ-2 ( 1999 Pfizer) 10/29/2019   Q1: Little interest or pleasure in doing things 0   Q2: Feeling down, depressed or hopeless 0   PHQ-2 Score 0   Q1: Little interest or pleasure in doing things Not at all   Q2: Feeling down, depressed or hopeless Not at all   PHQ-2 Score 0       Abuse: Current or Past(Physical, Sexual or Emotional)- No  Do you feel safe in your environment? Yes    Have you ever done Advance Care Planning? (For example, a Health Directive, POLST, or a discussion with a medical provider about your wishes): Yes, patient states has an Advance Care Planning document and will bring a copy to the clinic.    Social History     Tobacco Use     Smoking status: Never Smoker     Smokeless tobacco: Never Used   Substance Use Topics     Alcohol use: No     Comment: quit      If you drink alcohol do you typically have >3 drinks per day or >7 drinks per week? No    Alcohol Use 10/29/2019   Prescreen: >3 drinks/day or >7 drinks/week? No   Prescreen: >3 drinks/day or >7 drinks/week? -   AUDIT SCORE  -     AUDIT - Alcohol Use Disorders Identification Test - Reproduced from the World Health Organization Audit 2001 (Second Edition) 5/4/2018   1.  How often do you have a drink containing alcohol? 2 to 3 times a  week   2.  How many drinks containing alcohol do you have on a typical day when you are drinking? 1 or 2   3.  How often do you have five or more drinks on one occasion? Less than monthly   4.  How often during the last year have you found that you were not able to stop drinking once you had started? Never   5.  How often during the last year have you failed to do what was normally expected of you because of drinking? Never   6.  How often during the last year have you needed a first drink in the morning to get yourself going after a heavy drinking session? Never   7.  How often during the last year have you had a feeling of guilt or remorse after drinking? Never   8.  How often during the last year have you been unable to remember what happened the night before because of your drinking? Less than monthly   9.  Have you or someone else been injured because of your drinking? No   10. Has a relative, friend, doctor or other health care worker been concerned about your drinking or suggested you cut down? No   TOTAL SCORE 5       Last PSA:   PSA   Date Value Ref Range Status   05/04/2018 0.93 0 - 4 ug/L Final     Comment:     Assay Method:  Chemiluminescence using Siemens Vista analyzer       Reviewed orders with patient. Reviewed health maintenance and updated orders accordingly - Yes      Reviewed and updated as needed this visit by clinical staff         Reviewed and updated as needed this visit by Provider            Review of Systems   Constitutional: Negative for chills and fever.   HENT: Negative for congestion and ear pain.    Eyes: Negative for pain.   Respiratory: Negative for cough.    Cardiovascular: Negative for chest pain.   Gastrointestinal: Negative for abdominal pain, constipation, diarrhea and hematochezia.   Genitourinary: Negative for frequency and hematuria.   Neurological: Negative for dizziness.   Psychiatric/Behavioral: The patient is not nervous/anxious.      CONSTITUTIONAL: NEGATIVE for fever,  "chills, change in weight  INTEGUMENTARY/SKIN: NEGATIVE for worrisome rashes, moles or lesions  EYES: NEGATIVE for vision changes or irritation  ENT: NEGATIVE for ear, mouth and throat problems  RESP: NEGATIVE for significant cough or SOB  CV: NEGATIVE for chest pain, palpitations or peripheral edema  GI: NEGATIVE for nausea, abdominal pain, heartburn, or change in bowel habits   male: negative for dysuria, hematuria, decreased urinary stream, erectile dysfunction, urethral discharge  MUSCULOSKELETAL: NEGATIVE for significant arthralgias or myalgia  NEURO: NEGATIVE for weakness, dizziness or paresthesias  PSYCHIATRIC: NEGATIVE for changes in mood or affect    OBJECTIVE:   /78 (Cuff Size: Adult Large)   Pulse 87   Temp 98  F (36.7  C) (Oral)   Ht 1.734 m (5' 8.25\")   Wt 103 kg (227 lb)   SpO2 100%   BMI 34.26 kg/m      Physical Exam  GENERAL: healthy, alert and no distress  EYES: Eyes grossly normal to inspection, PERRL and conjunctivae and sclerae normal  HENT: ear canals and TM's normal, nose and mouth without ulcers or lesions  NECK: no adenopathy, no asymmetry, masses, or scars and thyroid normal to palpation  RESP: lungs clear to auscultation - no rales, rhonchi or wheezes  CV: regular rate and rhythm, normal S1 S2, no S3 or S4, no murmur, click or rub, no peripheral edema and peripheral pulses strong  ABDOMEN: soft, nontender, no hepatosplenomegaly, no masses and bowel sounds normal  MS: no gross musculoskeletal defects noted, no edema  SKIN: no suspicious lesions or rashes. Great toenails thick, discolored and may be ingrown.  NEURO: Normal strength and tone, mentation intact and speech normal  PSYCH: mentation appears normal, affect normal/bright        ASSESSMENT/PLAN:       COUNSELING:   Reviewed preventive health counseling, as reflected in patient instructions       Regular exercise       Healthy diet/nutrition    Estimated body mass index is 34.26 kg/m  as calculated from the following:    " "Height as of this encounter: 1.734 m (5' 8.25\").    Weight as of this encounter: 103 kg (227 lb).     Weight management plan: Discussed healthy diet and exercise guidelines     reports that he has never smoked. He has never used smokeless tobacco.    Assessment and Plan - Decision Making    1. Routine general medical examination at a health care facility    Per HPI      2. Attention deficit hyperactivity disorder (ADHD), unspecified ADHD type    Per HPI    - amphetamine-dextroamphetamine (ADDERALL) 30 MG tablet; Take 1 tablet (30 mg) by mouth daily  Dispense: 30 tablet; Refill: 0  - amphetamine-dextroamphetamine (ADDERALL) 30 MG tablet; Take 1 tablet (30 mg) by mouth daily  Dispense: 30 tablet; Refill: 0  - amphetamine-dextroamphetamine (ADDERALL) 30 MG tablet; Take 1 tablet (30 mg) by mouth daily  Dispense: 30 tablet; Refill: 0    3. Onychomycosis with ingrown toenail    Per HPI    - PODIATRY/FOOT & ANKLE SURGERY REFERRAL    4. Erectile dysfunction, unspecified erectile dysfunction type    Per HPI    - sildenafil (REVATIO) 20 MG tablet; 2-5 at once, daily as needed  Dispense: 30 tablet; Refill: 11      Written instructions given as follows:    Patient Instructions   1. I recommend including veggies, fresh fruits (3 to 5 servings), nuts (unsalted) in your daily diet. Cut down on carbs like bread, pasta, rice, potatoes. Cut down on red meats like burgers etc. Increase healthy proteins like beans, tofu, tuna, chicken and turkey.    2. Get regular exercise like jogging, biking, swimming at least 3 times per week (150 minutes per week).      3. Try generic Viagra as needed. Possible side effects may be low blood pressure, dizziness, nasal congestion. Rare reports of vision or hearing problems. Stop the medicine and report any problems if they occur.     4. Avoid the sun or otherwise use sun screen - please look at the guide I gave you about melanoma.    5. See the dentist and eye doctor regularly.     6. Please call " [phone] to register for a class about advanced directive. Or mail the one you have at home.     7. Stop by our pharmacy and get the Shingles vaccine.    8. Set up the podiatry appointment at the  or call [phone].    9. See you at your next Adderall refill or in one year for a physical with fasting labs.

## 2019-10-29 NOTE — PATIENT INSTRUCTIONS
1. I recommend including veggies, fresh fruits (3 to 5 servings), nuts (unsalted) in your daily diet. Cut down on carbs like bread, pasta, rice, potatoes. Cut down on red meats like burgers etc. Increase healthy proteins like beans, tofu, tuna, chicken and turkey.    2. Get regular exercise like jogging, biking, swimming at least 3 times per week (150 minutes per week).      3. Try generic Viagra as needed. Possible side effects may be low blood pressure, dizziness, nasal congestion. Rare reports of vision or hearing problems. Stop the medicine and report any problems if they occur.     4. Avoid the sun or otherwise use sun screen - please look at the guide I gave you about melanoma.    5. See the dentist and eye doctor regularly.     6. Please call [phone] to register for a class about advanced directive. Or mail the one you have at home.     7. Stop by our pharmacy and get the Shingles vaccine.    8. Set up the podiatry appointment at the  or call [phone].    9. See you at your next Adderall refill or in one year for a physical with fasting labs.

## 2019-11-04 ENCOUNTER — HEALTH MAINTENANCE LETTER (OUTPATIENT)
Age: 54
End: 2019-11-04

## 2019-11-06 ENCOUNTER — OFFICE VISIT (OUTPATIENT)
Dept: PODIATRY | Facility: CLINIC | Age: 54
End: 2019-11-06
Payer: COMMERCIAL

## 2019-11-06 VITALS
DIASTOLIC BLOOD PRESSURE: 76 MMHG | WEIGHT: 227 LBS | SYSTOLIC BLOOD PRESSURE: 146 MMHG | BODY MASS INDEX: 34.26 KG/M2 | HEART RATE: 96 BPM

## 2019-11-06 DIAGNOSIS — B35.1 ONYCHOMYCOSIS: Primary | ICD-10-CM

## 2019-11-06 LAB
ALBUMIN SERPL-MCNC: 3.9 G/DL (ref 3.4–5)
ALP SERPL-CCNC: 68 U/L (ref 40–150)
ALT SERPL W P-5'-P-CCNC: 30 U/L (ref 0–70)
AST SERPL W P-5'-P-CCNC: 14 U/L (ref 0–45)
BILIRUB DIRECT SERPL-MCNC: 0.1 MG/DL (ref 0–0.2)
BILIRUB SERPL-MCNC: 0.4 MG/DL (ref 0.2–1.3)
PROT SERPL-MCNC: 7.8 G/DL (ref 6.8–8.8)

## 2019-11-06 PROCEDURE — 99243 OFF/OP CNSLTJ NEW/EST LOW 30: CPT | Performed by: PODIATRIST

## 2019-11-06 PROCEDURE — 80076 HEPATIC FUNCTION PANEL: CPT | Performed by: PODIATRIST

## 2019-11-06 PROCEDURE — 36415 COLL VENOUS BLD VENIPUNCTURE: CPT | Performed by: PODIATRIST

## 2019-11-06 RX ORDER — TERBINAFINE HYDROCHLORIDE 250 MG/1
250 TABLET ORAL DAILY
Qty: 30 TABLET | Refills: 2 | Status: SHIPPED | OUTPATIENT
Start: 2019-11-06 | End: 2021-04-27

## 2019-11-06 NOTE — PATIENT INSTRUCTIONS
Weight management plan: Patient was referred to their PCP to discuss a diet and exercise plan. El to follow up with Primary Care provider regarding elevated blood pressure.    We wish you continued good healing. If you have any questions or concerns, please do not hesitate to contact us at [phone]    Please remember to call and schedule a follow up appointment if one was recommended at your earliest convenience.   PODIATRY CLINIC HOURS  TELEPHONE NUMBER    Dr. Eduard Santana D.P.M FAC FAS    Clinics:  [place]  [place]  [facility]    [facility]   Tuesday 1PM-6PM  [place]  Wednesday 7AM-2PM  [place]  Thursday 10AM-6PM  [place]  Friday 7AM-3PM  [place]  Specialty schedulers:   [phone] to make an appointment with any Specialty Provider.        Urgent Care locations:    [facility] Monday-Friday 5 pm - 9 pm. Saturday-Sunday 9 am -5pm    Monday-Friday 11 am - 9 pm Saturday 9 am - 5 pm     Monday-Sunday 12 noon-8PM [phone]     If you need a medication refill, please contact us you may need lab work and/or a follow up visit prior to your refill (i.e. Antifungal medications).    [email or web address] (secure e-mail communication and access to your chart) to send a message or to make an appointment.    If MRI needed please call [facility] at [phone]
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
## 2020-07-16 ENCOUNTER — VIRTUAL VISIT (OUTPATIENT)
Dept: FAMILY MEDICINE | Facility: OTHER | Age: 55
End: 2020-07-16
Payer: COMMERCIAL

## 2020-07-16 PROCEDURE — 99421 OL DIG E/M SVC 5-10 MIN: CPT | Performed by: INTERNAL MEDICINE

## 2020-07-16 NOTE — PROGRESS NOTES
"Date: 2020 12:23:24  Clinician: Holly Amaya  Clinician NPI: [id number]  Patient: El Singh  Patient : 1965  Patient Address: [address] [place] [address]  Patient Phone: [phone]  Visit Protocol: URI  Patient Summary:  El is a 55 year old ( : 1965 ) male who initiated a Visit for COVID-19 (Coronavirus) evaluation and screening. When asked the question \"Please sign me up to receive news, health information and promotions from [employer].\", El responded \"Yes\".    El states his symptoms started suddenly 3-4 days ago.   His symptoms consist of nausea, diarrhea, malaise, a headache, chills, a sore throat, enlarged lymph nodes, myalgia, facial pain or pressure, and a cough. El also feels feverish but was unable to measure his temperature.   Symptom details     Cough: El coughs a few times an hour and his cough is not more bothersome at night. Phlegm does not come into his throat when he coughs. He does not believe his cough is caused by post-nasal drip.     Sore throat: El reports having mild throat pain (1-3 on a 10 point pain scale), does not have exudate on his tonsils, and can swallow liquids. The lymph nodes in his neck are enlarged. A rash has not appeared on the skin since the sore throat started.     Facial pain or pressure: The facial pain or pressure does not feel worse when bending or leaning forward.     Headache: He states the headache is moderate (4-6 on a 10 point pain scale).      El denies having wheezing, teeth pain, ageusia, vomiting, rhinitis, ear pain, anosmia, and nasal congestion. He also denies having recent facial or sinus surgery in the past 60 days, taking antibiotic medication in the past month, having a sinus infection within the past year, and double sickening (worsening symptoms after initial improvement). He is not experiencing dyspnea.   Precipitating events  Within the past week, El has not been exposed to " someone with strep throat. He has not recently been exposed to someone with influenza. El has not been in close contact with any high risk individuals.   Pertinent COVID-19 (Coronavirus) information  In the past 14 days, El has not worked in a congregate living setting.   He does not work or volunteer as healthcare worker or a  and does not work or volunteer in a healthcare facility.   El also has not lived in a congregate living setting in the past 14 days. He does not live with a healthcare worker.   El has had a close contact with a laboratory-confirmed COVID-19 patient within 14 days of symptom onset. Additional information about contact with COVID-19 (Coronavirus) patient as reported by the patient (free text): The person who we were exposed to is Juani Fowler, who is our son's housemate. Juani got it from his sister, Meaghan, who also tested positive. 10 days ago My wife and I were with Juani and 7 days ago I was with him again. I started to get symptoms 3 1/2 days ago and my wife started getting sick 2 days ago. We were in close contact with Juani and our son (who is awaiting his test results) including working side by side, socializing, and we hugged often.   Pertinent medical history  El does not need a return to work/school note.   Weight: 219 lbs   El does not smoke or use smokeless tobacco.   Weight: 219 lbs    MEDICATIONS: No current medications, ALLERGIES: NKDA  Clinician Response:  Dear El,   Your symptoms show that you may have coronavirus (COVID-19). This illness can cause fever, cough and trouble breathing. Many people get a mild case and get better on their own. Some people can get very sick.  What should I do?  We would like to test you for this virus.   1. Please call [phone] to schedule your visit. Explain that you were referred by [facility] to have a COVID-19 test. Be ready to share your [facility] visit ID number.  The following will serve as your  "written order for this COVID Test, ordered by me, for the indication of suspected COVID [Z20.828]: The test will be ordered in [employer], our electronic health record, after you are scheduled. It will show as ordered and authorized by Solo Hernandez MD.  Order: COVID-19 (Coronavirus) [facility].      2. When it's time for your COVID test:  Stay at least 6 feet away from others. (If someone will drive you to your test, stay in the backseat, as far away from the  as you can.)   Cover your mouth and nose with a mask, tissue or washcloth.  Go straight to the testing site. Don't make any stops on the way there or back.      3.Starting now: Stay home and away from others (self-isolate) until:   You've had no fever---and no medicine that reduces fever---for 3 full days (72 hours). And...   Your other symptoms have gotten better. For example, your cough or breathing has improved. And...   At least 10 days have passed since your symptoms started.       During this time, don't leave the house except for testing or medical care.   Stay in your own room, even for meals. Use your own bathroom if you can.   Stay away from others in your home. No hugging, kissing or shaking hands. No visitors.  Don't go to work, school or anywhere else.    Clean \"high touch\" surfaces often (doorknobs, counters, handles, etc.). Use a household cleaning spray or wipes. You'll find a full list of  on the EPA website: www.epa.gov/pesticide-registration/list-n-disinfectants-use-against-sars-cov-2.   Cover your mouth and nose with a mask, tissue or washcloth to avoid spreading germs.  Wash your hands and face often. Use soap and water.  Caregivers in these groups are at risk for severe illness due to COVID-19:  o People 65 years and older  o People who live in a nursing home or long-term care facility  o People with chronic disease (lung, heart, cancer, diabetes, kidney, liver, immunologic)  o People who have a " weakened immune system, including those who:   Are in cancer treatment  Take medicine that weakens the immune system, such as corticosteroids  Had a bone marrow or organ transplant  Have an immune deficiency  Have poorly controlled HIV or AIDS  Are obese (body mass index of 40 or higher)  Smoke regularly   o Caregivers should wear gloves while washing dishes, handling laundry and cleaning bedrooms and bathrooms.  o Use caution when washing and drying laundry: Don't shake dirty laundry, and use the warmest water setting that you can.  o For more tips, go to www.cdc.gov/coronavirus/2019-ncov/downloads/10Things.pdf.       How can I take care of myself?   Get lots of rest. Drink extra fluids (unless a doctor has told you not to).   Take Tylenol (acetaminophen) for fever or pain. If you have liver or kidney problems, ask your family doctor if it's okay to take Tylenol.   Adults can take either:    650 mg (two 325 mg pills) every 4 to 6 hours, or...   1,000 mg (two 500 mg pills) every 8 hours as needed.    Note: Don't take more than 3,000 mg in one day. Acetaminophen is found in many medicines (both prescribed and over-the-counter medicines). Read all labels to be sure you don't take too much.   For children, check the Tylenol bottle for the right dose. The dose is based on the child's age or weight.    If you have other health problems (like cancer, heart failure, an organ transplant or severe kidney disease): Call your specialty clinic if you don't feel better in the next 2 days.       Know when to call 911. Emergency warning signs include:    Trouble breathing or shortness of breath Pain or pressure in the chest that doesn't go away Feeling confused like you haven't felt before, or not being able to wake up Bluish-colored lips or face.  Where can I get more information?    [facility] -- About COVID-19: [email or web address]   CDC -- What to Do If You're Sick:  www.cdc.gov/coronavirus/2019-ncov/about/steps-when-sick.html   CDC -- Ending Home Isolation: www.cdc.gov/coronavirus/2019-ncov/hcp/disposition-in-home-patients.html   [facility] -- Caring for Someone: www.cdc.gov/coronavirus/2019-ncov/if-you-are-sick/care-for-someone.html   [facility] -- Interim Guidance for Hospital Discharge to Home: www.[facility].mn./diseases/coronavirus/hcp/hospdischarge.pdf   [facility] clinical trials (COVID-19 research studies): clinicalaffairs.[facility]-clinical-trials    Below are the COVID-19 hotlines at the [place] Department of Health ([facility]). Interpreters are available.    For health questions: Call 282-707-8015 or 1-458.346.6810 (7 a.m. to 7 p.m.) For questions about schools and childcare: Call 934-579-1311 or 1-513.479.7562 (7 a.m. to 7 p.m.)    Diagnosis: Myalgia  Diagnosis ICD: M79.1

## 2020-07-18 DIAGNOSIS — Z20.822 COVID-19 RULED OUT: Primary | ICD-10-CM

## 2020-07-18 PROCEDURE — U0003 INFECTIOUS AGENT DETECTION BY NUCLEIC ACID (DNA OR RNA); SEVERE ACUTE RESPIRATORY SYNDROME CORONAVIRUS 2 (SARS-COV-2) (CORONAVIRUS DISEASE [COVID-19]), AMPLIFIED PROBE TECHNIQUE, MAKING USE OF HIGH THROUGHPUT TECHNOLOGIES AS DESCRIBED BY CMS-2020-01-R: HCPCS | Performed by: FAMILY MEDICINE

## 2020-07-18 NOTE — LETTER
July 22, 2020        El Singh  [address] [address]    This letter provides a written record that you were tested for COVID-19 on 7/18/2020.       Your result was negative. This means that we didn t find the virus that causes COVID-19 in your sample. A test may show negative when you do actually have the virus. This can happen when the virus is in the early stages of infection, before you feel illness symptoms.    If you have symptoms   Stay home and away from others (self-isolate) until you meet ALL of the guidelines below:    You ve had no fever--and no medicine that reduces fever--for 3 full days (72 hours). And      Your other symptoms have gotten better. For example, your cough or breathing has improved. And     At least 10 days have passed since your symptoms started.    During this time:    Stay home. Don t go to work, school or anywhere else.     Stay in your own room, including for meals. Use your own bathroom if you can.    Stay away from others in your home. No hugging, kissing or shaking hands. No visitors.    Clean  high touch  surfaces often (doorknobs, counters, handles, etc.). Use a household cleaning spray or wipes. You can find a full list on the EPA website at www.epa.gov/pesticide-registration/list-n-disinfectants-use-against-sars-cov-2.    Cover your mouth and nose with a mask, tissue or washcloth to avoid spreading germs.    Wash your hands and face often with soap and water.    Going back to work  Check with your employer for any guidelines to follow for going back to work.    Employers: This document serves as formal notice that your employee tested negative for COVID-19, as of the testing date shown above.

## 2020-07-20 LAB
SARS-COV-2 RNA SPEC QL NAA+PROBE: NOT DETECTED
SPECIMEN SOURCE: NORMAL

## 2020-11-16 ENCOUNTER — HEALTH MAINTENANCE LETTER (OUTPATIENT)
Age: 55
End: 2020-11-16

## 2021-01-15 ENCOUNTER — HEALTH MAINTENANCE LETTER (OUTPATIENT)
Age: 56
End: 2021-01-15

## 2021-03-28 ENCOUNTER — TRANSFERRED RECORDS (OUTPATIENT)
Dept: HEALTH INFORMATION MANAGEMENT | Facility: CLINIC | Age: 56
End: 2021-03-28

## 2021-04-27 ENCOUNTER — HOSPITAL ENCOUNTER (EMERGENCY)
Facility: CLINIC | Age: 56
Discharge: HOME | End: 2021-04-27
Attending: FAMILY MEDICINE | Admitting: FAMILY MEDICINE
Payer: COMMERCIAL

## 2021-04-27 ENCOUNTER — APPOINTMENT (OUTPATIENT)
Dept: MRI IMAGING | Facility: CLINIC | Age: 56
End: 2021-04-27
Attending: FAMILY MEDICINE
Payer: COMMERCIAL

## 2021-04-27 VITALS
RESPIRATION RATE: 16 BRPM | HEART RATE: 61 BPM | SYSTOLIC BLOOD PRESSURE: 136 MMHG | TEMPERATURE: 98.7 F | OXYGEN SATURATION: 97 % | DIASTOLIC BLOOD PRESSURE: 89 MMHG | HEIGHT: 70 IN | WEIGHT: 205 LBS | BODY MASS INDEX: 29.35 KG/M2

## 2021-04-27 DIAGNOSIS — R20.0 NUMBNESS AND TINGLING OF RIGHT UPPER EXTREMITY: ICD-10-CM

## 2021-04-27 DIAGNOSIS — I63.40 CEREBROVASCULAR ACCIDENT (CVA) DUE TO EMBOLISM OF CEREBRAL ARTERY (H): ICD-10-CM

## 2021-04-27 DIAGNOSIS — R20.2 NUMBNESS AND TINGLING OF RIGHT UPPER EXTREMITY: ICD-10-CM

## 2021-04-27 LAB
ALBUMIN SERPL-MCNC: 3.9 G/DL (ref 3.4–5)
ALP SERPL-CCNC: 65 U/L (ref 40–150)
ALT SERPL W P-5'-P-CCNC: 48 U/L (ref 0–70)
ANION GAP SERPL CALCULATED.3IONS-SCNC: 5 MMOL/L (ref 3–14)
AST SERPL W P-5'-P-CCNC: 14 U/L (ref 0–45)
BASOPHILS # BLD AUTO: 0 10E9/L (ref 0–0.2)
BASOPHILS NFR BLD AUTO: 0.8 %
BILIRUB SERPL-MCNC: 0.3 MG/DL (ref 0.2–1.3)
BUN SERPL-MCNC: 16 MG/DL (ref 7–30)
CALCIUM SERPL-MCNC: 8.8 MG/DL (ref 8.5–10.1)
CHLORIDE SERPL-SCNC: 107 MMOL/L (ref 94–109)
CO2 SERPL-SCNC: 30 MMOL/L (ref 20–32)
CREAT SERPL-MCNC: 0.91 MG/DL (ref 0.66–1.25)
D DIMER PPP FEU-MCNC: 0.3 UG/ML FEU (ref 0–0.5)
DIFFERENTIAL METHOD BLD: NORMAL
EOSINOPHIL # BLD AUTO: 0.2 10E9/L (ref 0–0.7)
EOSINOPHIL NFR BLD AUTO: 3.6 %
ERYTHROCYTE [DISTWIDTH] IN BLOOD BY AUTOMATED COUNT: 12.8 % (ref 10–15)
FLUAV RNA RESP QL NAA+PROBE: NEGATIVE
FLUBV RNA RESP QL NAA+PROBE: NEGATIVE
GFR SERPL CREATININE-BSD FRML MDRD: >90 ML/MIN/{1.73_M2}
GLUCOSE BLDC GLUCOMTR-MCNC: 95 MG/DL (ref 70–99)
GLUCOSE SERPL-MCNC: 100 MG/DL (ref 70–99)
HCT VFR BLD AUTO: 44.8 % (ref 40–53)
HGB BLD-MCNC: 15.3 G/DL (ref 13.3–17.7)
IMM GRANULOCYTES # BLD: 0 10E9/L (ref 0–0.4)
IMM GRANULOCYTES NFR BLD: 0.4 %
LABORATORY COMMENT REPORT: NORMAL
LDLC SERPL DIRECT ASSAY-MCNC: 65 MG/DL
LYMPHOCYTES # BLD AUTO: 1.8 10E9/L (ref 0.8–5.3)
LYMPHOCYTES NFR BLD AUTO: 36.2 %
MCH RBC QN AUTO: 29.7 PG (ref 26.5–33)
MCHC RBC AUTO-ENTMCNC: 34.2 G/DL (ref 31.5–36.5)
MCV RBC AUTO: 87 FL (ref 78–100)
MONOCYTES # BLD AUTO: 0.3 10E9/L (ref 0–1.3)
MONOCYTES NFR BLD AUTO: 6.1 %
NEUTROPHILS # BLD AUTO: 2.7 10E9/L (ref 1.6–8.3)
NEUTROPHILS NFR BLD AUTO: 52.9 %
NRBC # BLD AUTO: 0 10*3/UL
NRBC BLD AUTO-RTO: 0 /100
PLATELET # BLD AUTO: 218 10E9/L (ref 150–450)
POTASSIUM SERPL-SCNC: 3.6 MMOL/L (ref 3.4–5.3)
PROT SERPL-MCNC: 7.4 G/DL (ref 6.8–8.8)
RBC # BLD AUTO: 5.15 10E12/L (ref 4.4–5.9)
RSV RNA SPEC QL NAA+PROBE: NORMAL
SARS-COV-2 RNA RESP QL NAA+PROBE: NEGATIVE
SODIUM SERPL-SCNC: 142 MMOL/L (ref 133–144)
SPECIMEN SOURCE: NORMAL
TROPONIN I SERPL-MCNC: <0.015 UG/L (ref 0–0.04)
WBC # BLD AUTO: 5.1 10E9/L (ref 4–11)

## 2021-04-27 PROCEDURE — 85379 FIBRIN DEGRADATION QUANT: CPT | Performed by: FAMILY MEDICINE

## 2021-04-27 PROCEDURE — C9803 HOPD COVID-19 SPEC COLLECT: HCPCS | Performed by: FAMILY MEDICINE

## 2021-04-27 PROCEDURE — 83721 ASSAY OF BLOOD LIPOPROTEIN: CPT | Performed by: FAMILY MEDICINE

## 2021-04-27 PROCEDURE — 80053 COMPREHEN METABOLIC PANEL: CPT | Performed by: FAMILY MEDICINE

## 2021-04-27 PROCEDURE — 93010 ELECTROCARDIOGRAM REPORT: CPT | Performed by: FAMILY MEDICINE

## 2021-04-27 PROCEDURE — 255N000002 HC RX 255 OP 636: Performed by: FAMILY MEDICINE

## 2021-04-27 PROCEDURE — 84484 ASSAY OF TROPONIN QUANT: CPT | Performed by: FAMILY MEDICINE

## 2021-04-27 PROCEDURE — 99285 EMERGENCY DEPT VISIT HI MDM: CPT | Mod: 25 | Performed by: FAMILY MEDICINE

## 2021-04-27 PROCEDURE — 85025 COMPLETE CBC W/AUTO DIFF WBC: CPT | Performed by: FAMILY MEDICINE

## 2021-04-27 PROCEDURE — 999N001017 HC STATISTIC GLUCOSE BY METER IP

## 2021-04-27 PROCEDURE — A9585 GADOBUTROL INJECTION: HCPCS | Performed by: FAMILY MEDICINE

## 2021-04-27 PROCEDURE — 70553 MRI BRAIN STEM W/O & W/DYE: CPT

## 2021-04-27 PROCEDURE — 93005 ELECTROCARDIOGRAM TRACING: CPT | Performed by: FAMILY MEDICINE

## 2021-04-27 PROCEDURE — 87636 SARSCOV2 & INF A&B AMP PRB: CPT | Performed by: FAMILY MEDICINE

## 2021-04-27 RX ORDER — GADOBUTROL 604.72 MG/ML
9 INJECTION INTRAVENOUS ONCE
Status: COMPLETED | OUTPATIENT
Start: 2021-04-27 | End: 2021-04-27

## 2021-04-27 RX ORDER — ATORVASTATIN CALCIUM 40 MG/1
40 TABLET, FILM COATED ORAL DAILY
Qty: 90 TABLET | Refills: 0 | Status: SHIPPED | OUTPATIENT
Start: 2021-04-27 | End: 2021-06-24

## 2021-04-27 RX ORDER — ASPIRIN 325 MG
325 TABLET ORAL DAILY
COMMUNITY
Start: 2021-03-30 | End: 2021-04-30

## 2021-04-27 RX ORDER — ATORVASTATIN CALCIUM 40 MG/1
1 TABLET, FILM COATED ORAL AT BEDTIME
COMMUNITY
Start: 2021-03-29 | End: 2021-06-24

## 2021-04-27 RX ADMIN — GADOBUTROL 9 ML: 604.72 INJECTION INTRAVENOUS at 14:44

## 2021-04-27 ASSESSMENT — ENCOUNTER SYMPTOMS
CONSTIPATION: 0
FEVER: 0
NAUSEA: 0
SINUS PRESSURE: 0
CHILLS: 0
VOMITING: 0
DIAPHORESIS: 0
ABDOMINAL PAIN: 0
FREQUENCY: 0
SHORTNESS OF BREATH: 0
SORE THROAT: 0
HEADACHES: 1
NUMBNESS: 1
WHEEZING: 0
DYSURIA: 0
BLOOD IN STOOL: 0
COUGH: 0
DIARRHEA: 0
PALPITATIONS: 0

## 2021-04-27 ASSESSMENT — MIFFLIN-ST. JEOR: SCORE: 1761.12

## 2021-04-27 NOTE — ED NOTES
No code stroke. Will place pt in special isolation secondary to mild congestion past four days until covid test is back.

## 2021-04-27 NOTE — ED TRIAGE NOTES
Woke up with right arm tingling and dizziness. Denies weakness. Hx of stroke in March. Stroke eval called.
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
## 2021-04-27 NOTE — DISCHARGE INSTRUCTIONS
ICD-10-CM    1. Numbness and tingling of right upper extremity  R20.0 LDL cholesterol direct    R20.2     I suspect this is recrudescence - with symptoms in the same neurologic territory of prior stroke precipitated by illness.  alternatively this could be median neuropathy - carpal tunnel.  consider wrist splinting for the next 2 weeks.   2. Cerebrovascular accident (CVA) due to embolism of cerebral artery (H)  I63.40 NEUROLOGY ADULT REFERRAL     Leadless EKG Monitor 8 to 14 Days

## 2021-06-04 ENCOUNTER — OFFICE VISIT (OUTPATIENT)
Dept: FAMILY MEDICINE | Facility: CLINIC | Age: 56
End: 2021-06-04
Payer: COMMERCIAL

## 2021-06-04 ENCOUNTER — ANCILLARY PROCEDURE (OUTPATIENT)
Dept: CARDIOLOGY | Facility: CLINIC | Age: 56
End: 2021-06-04
Attending: FAMILY MEDICINE
Payer: COMMERCIAL

## 2021-06-04 VITALS
BODY MASS INDEX: 31.35 KG/M2 | DIASTOLIC BLOOD PRESSURE: 92 MMHG | OXYGEN SATURATION: 99 % | WEIGHT: 219 LBS | SYSTOLIC BLOOD PRESSURE: 148 MMHG | HEART RATE: 71 BPM | HEIGHT: 70 IN | TEMPERATURE: 97.5 F

## 2021-06-04 DIAGNOSIS — I63.9 CEREBROVASCULAR ACCIDENT (CVA), UNSPECIFIED MECHANISM (H): ICD-10-CM

## 2021-06-04 DIAGNOSIS — I10 HYPERTENSION GOAL BP (BLOOD PRESSURE) < 140/90: ICD-10-CM

## 2021-06-04 DIAGNOSIS — I63.9 CEREBROVASCULAR ACCIDENT (CVA), UNSPECIFIED MECHANISM (H): Primary | ICD-10-CM

## 2021-06-04 PROCEDURE — 93246 EXT ECG>7D<15D RECORDING: CPT | Performed by: INTERNAL MEDICINE

## 2021-06-04 PROCEDURE — 99214 OFFICE O/P EST MOD 30 MIN: CPT | Performed by: FAMILY MEDICINE

## 2021-06-04 RX ORDER — LOSARTAN POTASSIUM 50 MG/1
50 TABLET ORAL DAILY
Qty: 90 TABLET | Refills: 3 | Status: SHIPPED | OUTPATIENT
Start: 2021-06-04 | End: 2023-02-01

## 2021-06-04 ASSESSMENT — MIFFLIN-ST. JEOR: SCORE: 1824.63

## 2021-06-04 NOTE — PROGRESS NOTES
Subjective:    El is a 56 year old male here for Hospital and ER follow-up:      Please note: only the issues listed at the bottom under Assessment and Plan are addressed today. The rest of the medical problems are listed for the sake of completeness.      [place] in [place] -    CVA - presented in March 2021 with right facial, right arm and right leg numbness. Later in April it recurred. Since then no symptoms. He feels fine.  Evaluation and treatment:    Hospitalized in [place] 3/28 to 3/29/21 - MRI 8 mm lacunar infarct central left thalamus. CTA negative. TTE with bubble negative. Hypercoag w/u showed ALLEGRA 1:320 homogenous pattern, negative anticardiolipin, negative lupus anticoagulant, negative beta 2 microglobulin, elevated protein S activity, low antithrombin activity, normal protein C activity.   He was discharged in  mg daily.   ER in [place] 4/27/21- repeat MRI same area of stroke but 3 mm this time.   They said they ordered zio to look for a fib but it was not done - we are placing that today.   I am referring him to neurology.    Family history of blood clots and factor V leiden mutation - see FH. No personal history of blood clots. But in March 2021 he had a stroke as above.  Evaluation and treatment:    Negative for factor V leiden mutation on 4/15/14.    Now takes  mg qd.    He has some abnormalities on hypercoag w/u as above.   He might need hematology referral to determine if anticoagulation is warranted.    Positive ALLEGRA -   Found as part of hypercoag w/u - see above.   By itself the meaning is questionable.   We will assess symptoms periodically.    HTN - not checking at home.  Evaluation and treatment:   Obesity and sleep apnea contribute - see below.   I asked him to start Losartan 50 mg daily.   Check at home and bring numbers to follow up visit.     BP Readings from Last 6 Encounters:   06/04/21 (!) 148/92   04/27/21 136/89   11/06/19 (!) 146/76   10/29/19 138/78    05/23/19 (!) 139/94   05/20/19 (!) 142/97        Last Comprehensive Metabolic Panel:  Sodium   Date Value Ref Range Status   04/27/2021 142 133 - 144 mmol/L Final     Potassium   Date Value Ref Range Status   04/27/2021 3.6 3.4 - 5.3 mmol/L Final     Chloride   Date Value Ref Range Status   04/27/2021 107 94 - 109 mmol/L Final     Carbon Dioxide   Date Value Ref Range Status   04/27/2021 30 20 - 32 mmol/L Final     Anion Gap   Date Value Ref Range Status   04/27/2021 5 3 - 14 mmol/L Final     Glucose   Date Value Ref Range Status   04/27/2021 100 (H) 70 - 99 mg/dL Final     Urea Nitrogen   Date Value Ref Range Status   04/27/2021 16 7 - 30 mg/dL Final     Creatinine   Date Value Ref Range Status   04/27/2021 0.91 0.66 - 1.25 mg/dL Final     GFR Estimate   Date Value Ref Range Status   04/27/2021 >90 >60 mL/min/[1.73_m2] Final     Comment:     Non  GFR Calc  Starting 12/18/2018, serum creatinine based estimated GFR (eGFR) will be   calculated using the Chronic Kidney Disease Epidemiology Collaboration   (CKD-EPI) equation.       Calcium   Date Value Ref Range Status   04/27/2021 8.8 8.5 - 10.1 mg/dL Final         ADHD - Symptoms are poor his attention, focus, easy distractibility, insomnia and mood swings.   Evaluation and treatment:    dx is based on evaluation by [facility] on 3/11, 3/18 and 3/27/13.    Adderall - no longer taking. We will not restart given history of stroke.    Insomnia - stable on Melatonin prn.    Shoulder and hip pain - following with ortho. Injections and PT have been helpful.     Dyslipidemia - No history of CAD, CVA, PAD or diabetes. But now he has had CVA as above.   Evaluation and treatment:    Needs moderate to high intensity statin.   Lipitor 40 mg daily started in [place] after his stroke - no side effects.   Diet and exercise discussed.   Continue same tx.    Recent Labs   Lab Test 04/27/21  1213 10/22/19  0857 05/04/18  0907 04/15/15  0941 04/15/14  0952   CHOL   --  237* 206* 244* 234*   HDL  --  64 64 74 67   LDL 65 155* 125* 155* 147*   TRIG  --  91 87 77 101   CHOLHDLRATIO  --   --   --  3.3 3.5     Sleep apnea -   Evaluation and treatment:    He did not tolerate CPAP    Obesity -   Evaluation and treatment:    Previously declined nutrition referral.    Discussed diet and exercise.   He plans to start exercising.    Body mass index is 31.42 kg/m .      Wt Readings from Last 5 Encounters:   06/04/21 99.3 kg (219 lb)   04/27/21 93 kg (205 lb)   11/06/19 103 kg (227 lb)   10/29/19 103 kg (227 lb)   05/04/18 102.1 kg (225 lb)         Vitamin D insufficiency - Vit D 2/27/13 showed borderline low level. He is not consistent with taking over the counter Vit D 1000 units daily    ED - erections have been poor for a while. They are unreliable. Libido is fine.  Evaluation and treatment:    No contraindications to PDE5 inhibitors.   Generic Sildenafil prn.    Onychomycosis - on great toes.  Evaluation and treatment:    Referred previously to podiatry.    Preventive: declines flu shot. Declines Shingrix at our pharmacy.     Immunization History   Administered Date(s) Administered     Influenza (IIV3) PF 09/21/2010     Influenza (intradermal) 03/06/2013     Influenza Vaccine IM > 6 months Valent IIV4 11/26/2013     TD (ADULT, 7+) 01/16/2006     TDAP Vaccine (Adacel) 03/06/2013     Diabetes screen:     Last Comprehensive Metabolic Panel:  Sodium   Date Value Ref Range Status   04/27/2021 142 133 - 144 mmol/L Final     Potassium   Date Value Ref Range Status   04/27/2021 3.6 3.4 - 5.3 mmol/L Final     Chloride   Date Value Ref Range Status   04/27/2021 107 94 - 109 mmol/L Final     Carbon Dioxide   Date Value Ref Range Status   04/27/2021 30 20 - 32 mmol/L Final     Anion Gap   Date Value Ref Range Status   04/27/2021 5 3 - 14 mmol/L Final     Glucose   Date Value Ref Range Status   04/27/2021 100 (H) 70 - 99 mg/dL Final     Urea Nitrogen   Date Value Ref Range Status   04/27/2021 16  "7 - 30 mg/dL Final     Creatinine   Date Value Ref Range Status   04/27/2021 0.91 0.66 - 1.25 mg/dL Final     GFR Estimate   Date Value Ref Range Status   04/27/2021 >90 >60 mL/min/[1.73_m2] Final     Comment:     Non  GFR Calc  Starting 12/18/2018, serum creatinine based estimated GFR (eGFR) will be   calculated using the Chronic Kidney Disease Epidemiology Collaboration   (CKD-EPI) equation.       Calcium   Date Value Ref Range Status   04/27/2021 8.8 8.5 - 10.1 mg/dL Final     Colonoscopy: 5/7/14 - repeat in 10 years    Hep C: Neg 4/15/14    STD screen - declines    Screening for prostate cancer - discussed controversy.    PSA   Date Value Ref Range Status   05/04/2018 0.93 0 - 4 ug/L Final     Comment:     Assay Method:  Chemiluminescence using Siemens Vista analyzer     Advanced directive: he will send a copy from home.    SH:    Marital status:   Kids: 4  Employment: construction and real estate  Exercise: golfing, walking  Tobacco: no  Etoh: 6-8 per week  Recreational drugs: no  Caffeine: one per day    FH:    Dad had PE and had factor V leiden. Cousin had blood clot in her legs and had factor V leiden.     Exam:    BP (!) 148/92   Pulse 71   Temp 97.5  F (36.4  C) (Tympanic)   Ht 1.778 m (5' 10\")   Wt 99.3 kg (219 lb)   SpO2 99%   BMI 31.42 kg/m      Gen: Healthy appearing male in no acute distress  Lungs: Good air movement and otherwise clear.  CV: Heart RRR with no murmurs. No JVD, carotid bruits or leg edema.  Neuro: pt is alert and oriented. CN II-XII intact. Strength 5/5 in , biceps, quads, hams. Sensation intact upper and lower extremeties to soft touch. Gait is normal.        Assessment and Plan - Decision Making    1. Cerebrovascular accident (CVA), unspecified mechanism (H)    Per HPI    - Leadless EKG Monitor 8 to 14 Days; Future  - NEUROLOGY ADULT REFERRAL    2. Hypertension goal BP (blood pressure) < 140/90    Per HPI    - losartan (COZAAR) 50 MG tablet; Take 1 " tablet (50 mg) by mouth daily  Dispense: 90 tablet; Refill: 3      Written instructions given as follows:    Patient Instructions   1. Set up neurology appointment - see below for phone number.    2. Start Losartan 50 mg daily for blood pressure - start with 1/2 tab for a few days, then go to a full tab.    3. Check blood pressure at home daily and bring results to me in a few weeks.

## 2021-06-04 NOTE — PATIENT INSTRUCTIONS
1. Set up neurology appointment - see below for phone number.    2. Start Losartan 50 mg daily for blood pressure - start with 1/2 tab for a few days, then go to a full tab.    3. Check blood pressure at home daily and bring results to me in a few weeks.

## 2021-06-04 NOTE — PROGRESS NOTES
I have place a 10days Zio Patch on this patient. Patient was given Zio Patch instructions and [employer] contact information. Patient understands when they should remove and return their monitor to [employer].   Geeta Sosa MA

## 2021-06-23 NOTE — PROGRESS NOTES
"  3  SUBJECTIVE:   CC: El Singh is an 56 year old male who presents for preventive health visit.     {Split Bill scripting  The purpose of this visit is to discuss your medical history and prevent health problems before you are sick. You may be responsible for a co-pay, coinsurance, or deductible if your visit today includes services such as checking on a sore throat, having an x-ray or lab test, or treating and evaluating a new or existing condition :318687}  Patient has been advised of split billing requirements and indicates understanding: {Yes and No:522583}  Healthy Habits:    Do you get at least three servings of calcium containing foods daily (dairy, green leafy vegetables, etc.)? { :954880::\"yes\"}    Amount of exercise or daily activities, outside of work: { :607823}    Problems taking medications regularly { :656557::\"No\"}    Medication side effects: { :162293::\"No\"}    Have you had an eye exam in the past two years? { :302835}    Do you see a dentist twice per year? { :004285}    Do you have sleep apnea, excessive snoring or daytime drowsiness?{ :242052}  {Outside tests to abstract? :664776}    {additional problems to add (Optional):821509}    Today's PHQ-2 Score:   PHQ-2 ( 1999 Pfizer) 6/4/2021 10/29/2019   Q1: Little interest or pleasure in doing things 0 0   Q2: Feeling down, depressed or hopeless 0 0   PHQ-2 Score 0 0   Q1: Little interest or pleasure in doing things - Not at all   Q2: Feeling down, depressed or hopeless - Not at all   PHQ-2 Score - 0     {PHQ-2 LOOK IN ASSESSMENTS (Optional) :265711}  Abuse: Current or Past(Physical, Sexual or Emotional)- {YES/NO/NA:406134}  Do you feel safe in your environment? {YES/NO/NA:454732}        Social History     Tobacco Use     Smoking status: Never Smoker     Smokeless tobacco: Never Used   Substance Use Topics     Alcohol use: No     Comment: quit      If you drink alcohol do you typically have >3 drinks per day or >7 drinks per week? " "{ETOH :065126}                      Last PSA:   PSA   Date Value Ref Range Status   05/04/2018 0.93 0 - 4 ug/L Final     Comment:     Assay Method:  Chemiluminescence using Siemens Vista analyzer       Reviewed orders with patient. Reviewed health maintenance and updated orders accordingly - {Yes/No:853506::\"Yes\"}  {Chronicprobdata (Optional):394813}    Reviewed and updated as needed this visit by clinical staff                 Reviewed and updated as needed this visit by Provider                {HISTORY OPTIONS (Optional):150971}    ROS:  { :031781::\"CONSTITUTIONAL: NEGATIVE for fever, chills, change in weight\",\"INTEGUMENTARY/SKIN: NEGATIVE for worrisome rashes, moles or lesions\",\"EYES: NEGATIVE for vision changes or irritation\",\"ENT: NEGATIVE for ear, mouth and throat problems\",\"RESP: NEGATIVE for significant cough or SOB\",\"CV: NEGATIVE for chest pain, palpitations or peripheral edema\",\"GI: NEGATIVE for nausea, abdominal pain, heartburn, or change in bowel habits\",\" male: negative for dysuria, hematuria, decreased urinary stream, erectile dysfunction, urethral discharge\",\"MUSCULOSKELETAL: NEGATIVE for significant arthralgias or myalgia\",\"NEURO: NEGATIVE for weakness, dizziness or paresthesias\",\"PSYCHIATRIC: NEGATIVE for changes in mood or affect\"}    OBJECTIVE:   There were no vitals taken for this visit.  EXAM:  {Exam Choices:748090}    {Diagnostic Test Results (Optional):054494::\"Diagnostic Test Results:\",\"Labs reviewed in Epic\"}    ASSESSMENT/PLAN:   {Diag Picklist:257725}    Patient has been advised of split billing requirements and indicates understanding: {YES / NO:735221::\"Yes\"}  COUNSELING:  {MALE COUNSELING MESSAGES:558220::\"Reviewed preventive health counseling, as reflected in patient instructions\"}    Estimated body mass index is 31.42 kg/m  as calculated from the following:    Height as of 6/4/21: 1.778 m (5' 10\").    Weight as of 6/4/21: 99.3 kg (219 lb).    {Weight Management Plan (ACO) Complete " if BMI is abnormal-  Ages 18-64  BMI >24.9.  Age 65+ with BMI <23 or >30 (Optional):714701}    He reports that he has never smoked. He has never used smokeless tobacco.      Counseling Resources:  ATP IV Guidelines  Pooled Cohorts Equation Calculator  FRAX Risk Assessment  ICSI Preventive Guidelines  Dietary Guidelines for Americans, 2010  USDA's MyPlate  ASA Prophylaxis  Lung CA Screening    DAVID SOARES MD  [facility]

## 2021-06-23 NOTE — PATIENT INSTRUCTIONS
1. Follow a healthy diet - reliable information is available at: myplate.gov.    2. Get regular exercise based on your abilities like walking, jogging, biking, swimming and strength training (150 minutes per week).      3. Avoid the sun or otherwise use sun screen to prevent skin cancer.    4. See the dentist and eye doctor regularly.     5. Advanced directive or living will is a good idea. Fill out the form by going to: [email or web address].[employer], then have it notarized and send me a copy.     6. Check blood pressure a couple of times per week - ideally it should be 130/80 or less but if 140/90 or higher persistently, let me know.    7. I will contact you with results. If all is well, see you in one year for a physical with fasting labs.

## 2021-06-24 ENCOUNTER — OFFICE VISIT (OUTPATIENT)
Dept: FAMILY MEDICINE | Facility: CLINIC | Age: 56
End: 2021-06-24
Payer: COMMERCIAL

## 2021-06-24 VITALS
HEART RATE: 66 BPM | BODY MASS INDEX: 31.78 KG/M2 | HEIGHT: 70 IN | DIASTOLIC BLOOD PRESSURE: 88 MMHG | WEIGHT: 222 LBS | SYSTOLIC BLOOD PRESSURE: 129 MMHG | TEMPERATURE: 96 F

## 2021-06-24 DIAGNOSIS — I10 HYPERTENSION GOAL BP (BLOOD PRESSURE) < 140/90: ICD-10-CM

## 2021-06-24 DIAGNOSIS — Z00.00 ROUTINE GENERAL MEDICAL EXAMINATION AT A HEALTH CARE FACILITY: Primary | ICD-10-CM

## 2021-06-24 DIAGNOSIS — E78.5 DYSLIPIDEMIA: ICD-10-CM

## 2021-06-24 DIAGNOSIS — I63.9 CEREBROVASCULAR ACCIDENT (CVA), UNSPECIFIED MECHANISM (H): ICD-10-CM

## 2021-06-24 LAB
ANION GAP SERPL CALCULATED.3IONS-SCNC: 5 MMOL/L (ref 3–14)
BUN SERPL-MCNC: 21 MG/DL (ref 7–30)
CALCIUM SERPL-MCNC: 9 MG/DL (ref 8.5–10.1)
CHLORIDE SERPL-SCNC: 109 MMOL/L (ref 94–109)
CHOLEST SERPL-MCNC: 156 MG/DL
CO2 SERPL-SCNC: 27 MMOL/L (ref 20–32)
CREAT SERPL-MCNC: 0.93 MG/DL (ref 0.66–1.25)
GFR SERPL CREATININE-BSD FRML MDRD: >90 ML/MIN/{1.73_M2}
GLUCOSE SERPL-MCNC: 98 MG/DL (ref 70–99)
HDLC SERPL-MCNC: 74 MG/DL
LDLC SERPL CALC-MCNC: 70 MG/DL
NONHDLC SERPL-MCNC: 82 MG/DL
POTASSIUM SERPL-SCNC: 4.7 MMOL/L (ref 3.4–5.3)
PSA SERPL-ACNC: 0.83 UG/L (ref 0–4)
SODIUM SERPL-SCNC: 141 MMOL/L (ref 133–144)
TRIGL SERPL-MCNC: 60 MG/DL

## 2021-06-24 PROCEDURE — 99396 PREV VISIT EST AGE 40-64: CPT | Performed by: FAMILY MEDICINE

## 2021-06-24 PROCEDURE — G0103 PSA SCREENING: HCPCS | Performed by: FAMILY MEDICINE

## 2021-06-24 PROCEDURE — 36415 COLL VENOUS BLD VENIPUNCTURE: CPT | Performed by: FAMILY MEDICINE

## 2021-06-24 PROCEDURE — 80061 LIPID PANEL: CPT | Performed by: FAMILY MEDICINE

## 2021-06-24 PROCEDURE — 80048 BASIC METABOLIC PNL TOTAL CA: CPT | Performed by: FAMILY MEDICINE

## 2021-06-24 RX ORDER — ATORVASTATIN CALCIUM 40 MG/1
40 TABLET, FILM COATED ORAL DAILY
Qty: 90 TABLET | Refills: 3 | Status: SHIPPED | OUTPATIENT
Start: 2021-06-24 | End: 2023-02-01

## 2021-06-24 ASSESSMENT — MIFFLIN-ST. JEOR: SCORE: 1838.24

## 2021-06-24 NOTE — PROGRESS NOTES
SUBJECTIVE:   CC: lE Singh is an 56 year old male who presents for preventative health visit.     Patient has been advised of split billing requirements and indicates understanding: Yes      [place] in [place] -    Skin check - he wants referral to a specific dermatologist.   I placed the referral.    Previous CVA - presented in March 2021 with right facial, right arm and right leg numbness. Later in April it recurred. Since then no symptoms. He feels fine.  Evaluation and treatment:    Hospitalized in [place] 3/28 to 3/29/21 - MRI 8 mm lacunar infarct central left thalamus. CTA negative. TTE with bubble negative. Hypercoag w/u showed ALLEGRA 1:320 homogenous pattern, negative anticardiolipin, negative lupus anticoagulant, negative beta 2 microglobulin, elevated protein S activity, low antithrombin activity, normal protein C activity.   He was discharged in  mg daily.   ER in [place] 4/27/21- repeat MRI same area of stroke but 3 mm this time.   They said they ordered zio to look for a fib but it was not done - we are placing that today.   I previously referred him to neurology.     Family history of blood clots and factor V leiden mutation - see FH. No personal history of blood clots. But in March 2021 he had a stroke as above.  Evaluation and treatment:    Negative for factor V leiden mutation on 4/15/14.    Now takes  mg qd.    He has some abnormalities on hypercoag w/u as above. To be addressed by neurology.    Positive ALLEGRA -   Found as part of hypercoag w/u - see above.   By itself the meaning is questionable.   We will assess symptoms periodically.    HTN - checking at home - around 120-130 systolic. Fairly controlled in clinic.  Evaluation and treatment:   Obesity and sleep apnea contribute - see below.   Losartan 50 mg daily.   Continue same tx.    BP Readings from Last 6 Encounters:   06/24/21 129/88   06/04/21 (!) 148/92   04/27/21 136/89   11/06/19 (!) 146/76   10/29/19 138/78    05/23/19 (!) 139/94        Last Comprehensive Metabolic Panel:  Sodium   Date Value Ref Range Status   06/24/2021 141 133 - 144 mmol/L Final     Potassium   Date Value Ref Range Status   06/24/2021 4.7 3.4 - 5.3 mmol/L Final     Chloride   Date Value Ref Range Status   06/24/2021 109 94 - 109 mmol/L Final     Carbon Dioxide   Date Value Ref Range Status   06/24/2021 27 20 - 32 mmol/L Final     Anion Gap   Date Value Ref Range Status   06/24/2021 5 3 - 14 mmol/L Final     Glucose   Date Value Ref Range Status   06/24/2021 98 70 - 99 mg/dL Final     Comment:     Fasting specimen     Urea Nitrogen   Date Value Ref Range Status   06/24/2021 21 7 - 30 mg/dL Final     Creatinine   Date Value Ref Range Status   06/24/2021 0.93 0.66 - 1.25 mg/dL Final     GFR Estimate   Date Value Ref Range Status   06/24/2021 >90 >60 mL/min/[1.73_m2] Final     Comment:     Non  GFR Calc  Starting 12/18/2018, serum creatinine based estimated GFR (eGFR) will be   calculated using the Chronic Kidney Disease Epidemiology Collaboration   (CKD-EPI) equation.       Calcium   Date Value Ref Range Status   06/24/2021 9.0 8.5 - 10.1 mg/dL Final       ADHD - Symptoms are poor his attention, focus, easy distractibility, insomnia and mood swings.   Evaluation and treatment:    dx is based on evaluation by [facility] on 3/11, 3/18 and 3/27/13.    Adderall - no longer taking. We will not restart given history of stroke.    Insomnia - stable on Melatonin prn.    Shoulder and hip pain - following with ortho. Injections and PT have been helpful.     Dyslipidemia - No history of CAD, CVA, PAD or diabetes. But now he has had CVA as above.   Evaluation and treatment:    Needs moderate to high intensity statin.   Lipitor 40 mg daily started in [place] after his stroke - no side effects.   Diet and exercise discussed.   Continue same tx.    Recent Labs   Lab Test 06/24/21  0957 04/27/21  1213 10/22/19  0857 04/15/15  0941 04/15/15  0941  04/15/14  0952   CHOL 156  --  237*   < > 244* 234*   HDL 74  --  64   < > 74 67   LDL 70 65 155*   < > 155* 147*   TRIG 60  --  91   < > 77 101   CHOLHDLRATIO  --   --   --   --  3.3 3.5    < > = values in this interval not displayed.     Sleep apnea -   Evaluation and treatment:    He did not tolerate CPAP    Obesity -   Evaluation and treatment:    Previously declined nutrition referral.    Discussed diet and exercise.   He plans to start exercising.    Body mass index is 31.85 kg/m .     Wt Readings from Last 5 Encounters:   06/24/21 100.7 kg (222 lb)   06/04/21 99.3 kg (219 lb)   04/27/21 93 kg (205 lb)   11/06/19 103 kg (227 lb)   10/29/19 103 kg (227 lb)     Vitamin D insufficiency - Vit D 2/27/13 showed borderline low level. He is not consistent with taking over the counter Vit D 1000 units daily    ED - erections have been poor for a while. They are unreliable. Libido is fine.  Evaluation and treatment:    No contraindications to PDE5 inhibitors.   Generic Sildenafil prn.    Onychomycosis - on great toes.  Evaluation and treatment:    Referred previously to podiatry.    Preventive: Declines Shingrix. Declines covid vaccine.    Immunization History   Administered Date(s) Administered     Influenza (IIV3) PF 09/21/2010     Influenza (intradermal) 03/06/2013     Influenza Vaccine IM > 6 months Valent IIV4 11/26/2013     TD (ADULT, 7+) 01/16/2006     TDAP Vaccine (Adacel) 03/06/2013     Colonoscopy: 5/7/14 - repeat in 10 years    Hep C: Neg 4/15/14    STD screen - declines    Screening for prostate cancer - discussed controversy.    PSA   Date Value Ref Range Status   06/24/2021 0.83 0 - 4 ug/L Final     Comment:     Assay Method:  Chemiluminescence using Siemens Vista analyzer     Advanced directive: he will send a copy from home.    SH:    Marital status:   Kids: 4  Employment: construction and real estate  Exercise: golfing, walking  Tobacco: no  Etoh: 6-8 per week  Recreational drugs: no  Caffeine:  one per day    FH:    Dad had PE and had factor V leiden. Cousin had blood clot in her legs and had factor V leiden.       Today's PHQ-2 Score:   PHQ-2 ( 1999 Pfizer) 6/4/2021   Q1: Little interest or pleasure in doing things 0   Q2: Feeling down, depressed or hopeless 0   PHQ-2 Score 0   Q1: Little interest or pleasure in doing things -   Q2: Feeling down, depressed or hopeless -   PHQ-2 Score -       Abuse: Current or Past(Physical, Sexual or Emotional)- No  Do you feel safe in your environment? Yes        Social History     Tobacco Use     Smoking status: Never Smoker     Smokeless tobacco: Never Used   Substance Use Topics     Alcohol use: No     Comment: quit          Alcohol Use 10/29/2019   Prescreen: >3 drinks/day or >7 drinks/week? No   Prescreen: >3 drinks/day or >7 drinks/week? -   AUDIT SCORE  -       Last PSA:   PSA   Date Value Ref Range Status   05/04/2018 0.93 0 - 4 ug/L Final     Comment:     Assay Method:  Chemiluminescence using Siemens Vista analyzer       Reviewed orders with patient. Reviewed health maintenance and updated orders accordingly - Yes      Reviewed and updated as needed this visit by clinical staff  Tobacco  Allergies  Meds              Reviewed and updated as needed this visit by Provider                    Review of Systems  CONSTITUTIONAL: NEGATIVE for fever, chills, change in weight  INTEGUMENTARY/SKIN: NEGATIVE for worrisome rashes, moles or lesions  EYES: NEGATIVE for vision changes or irritation  ENT: NEGATIVE for ear, mouth and throat problems  RESP: NEGATIVE for significant cough or SOB  CV: NEGATIVE for chest pain, palpitations or peripheral edema  GI: NEGATIVE for nausea, abdominal pain, heartburn, or change in bowel habits   male: negative for dysuria, hematuria, decreased urinary stream, erectile dysfunction, urethral discharge  MUSCULOSKELETAL: NEGATIVE for significant arthralgias or myalgia  NEURO: NEGATIVE for weakness, dizziness or  "paresthesias  PSYCHIATRIC: NEGATIVE for changes in mood or affect    OBJECTIVE:   /88   Pulse 66   Temp 96  F (35.6  C) (Tympanic)   Ht 1.778 m (5' 10\")   Wt 100.7 kg (222 lb)   BMI 31.85 kg/m      Physical Exam  GENERAL: healthy, alert and no distress  EYES: Eyes grossly normal to inspection, PERRL and conjunctivae and sclerae normal  HENT: ear canals and TM's normal, nose and mouth without ulcers or lesions  NECK: no adenopathy, no asymmetry, masses, or scars and thyroid normal to palpation  RESP: lungs clear to auscultation - no rales, rhonchi or wheezes  CV: regular rate and rhythm, normal S1 S2, no S3 or S4, no murmur, click or rub, no peripheral edema and peripheral pulses strong  ABDOMEN: soft, nontender, no hepatosplenomegaly, no masses and bowel sounds normal  MS: no gross musculoskeletal defects noted, no edema  SKIN: no suspicious lesions or rashes  NEURO: Normal strength and tone, mentation intact and speech normal  PSYCH: mentation appears normal, affect normal/bright        ASSESSMENT/PLAN:     COUNSELING:   Reviewed preventive health counseling, as reflected in patient instructions       Regular exercise       Healthy diet/nutrition    Estimated body mass index is 31.85 kg/m  as calculated from the following:    Height as of this encounter: 1.778 m (5' 10\").    Weight as of this encounter: 100.7 kg (222 lb).     Weight management plan: Discussed healthy diet and exercise guidelines    He reports that he has never smoked. He has never used smokeless tobacco.    Assessment and Plan - Decision Making    1. Routine general medical examination at a health care facility    Results of today's exam given to patient verbally along with age appropriate preventive measures. Written instructions reviewed and handed to the patient.    - Basic metabolic panel  - Lipid panel reflex to direct LDL Fasting  - PSA, screen  - ADULT DERMATOLOGY REFERRAL; Future    2. Dyslipidemia    Per HPI    - atorvastatin " (LIPITOR) 40 MG tablet; Take 1 tablet (40 mg) by mouth daily  Dispense: 90 tablet; Refill: 3    3. Cerebrovascular accident (CVA), unspecified mechanism (H)    Per HPI    - atorvastatin (LIPITOR) 40 MG tablet; Take 1 tablet (40 mg) by mouth daily  Dispense: 90 tablet; Refill: 3    4. Hypertension goal BP (blood pressure) < 140/90    Per HPI        Written instructions given as follows:    Patient Instructions   1. Follow a healthy diet - reliable information is available at: myplate.gov.    2. Get regular exercise based on your abilities like walking, jogging, biking, swimming and strength training (150 minutes per week).      3. Avoid the sun or otherwise use sun screen to prevent skin cancer.    4. See the dentist and eye doctor regularly.     5. Advanced directive or living will is a good idea. Fill out the form by going to: [email or web address], then have it notarized and send me a copy.     6. Check blood pressure a couple of times per week - ideally it should be 130/80 or less but if 140/90 or higher persistently, let me know.    7. I will contact you with results. If all is well, see you in one year for a physical with fasting labs.

## 2021-07-08 ENCOUNTER — MYC MEDICAL ADVICE (OUTPATIENT)
Dept: FAMILY MEDICINE | Facility: CLINIC | Age: 56
End: 2021-07-08

## 2021-07-19 ENCOUNTER — PRE VISIT (OUTPATIENT)
Dept: NEUROLOGY | Facility: CLINIC | Age: 56
End: 2021-07-19

## 2021-07-19 NOTE — TELEPHONE ENCOUNTER
FUTURE VISIT INFORMATION      FUTURE VISIT INFORMATION:    Date: 7/26/2021    Time: 4pm    Location: [facility] [place]  REFERRAL INFORMATION:    Referring provider:  Dr. Burt     Referring providers clinic:  [facility] ED     Reason for visit/diagnosis  CVA     RECORDS REQUESTED FROM:       Clinic name Comments Records Status Imaging Status   Internal ED Visit-4/27/2021    Dr. Willson-6/4/2021, 6/24/2021    MR Brain-4/27/2021 Epic PACS         [facility]  CTA Head/Neck-3/28/2021    US Carotid-3/28/2021    MRI Brain-3/28/2021 [facility] Requested by mail                        7/19/2021-Request for Images faxed to [facility]-MR @ 339pm    Action 7/28/21 MV 1.50pm   Action Taken Records and imaging received from [facility] via mail. Sent recs to scanning and imaging disks to [facility] for processing.

## 2021-07-26 ENCOUNTER — VIRTUAL VISIT (OUTPATIENT)
Dept: NEUROLOGY | Facility: CLINIC | Age: 56
End: 2021-07-26
Attending: FAMILY MEDICINE
Payer: COMMERCIAL

## 2021-07-26 DIAGNOSIS — Z86.73 HISTORY OF STROKE: Primary | ICD-10-CM

## 2021-07-26 PROCEDURE — 99204 OFFICE O/P NEW MOD 45 MIN: CPT | Mod: 95 | Performed by: PSYCHIATRY & NEUROLOGY

## 2021-07-26 NOTE — LETTER
7/26/2021       RE: El Singh  [address] [address] [address]     Dear Colleague,    Thank you for referring your patient, El Singh, to the [facility] NEUROLOGY CLINIC [place] at [facility]. Please see a copy of my visit note below.    [facility]    El Singh MRN# [id number]   Age: 56 year old YOB: 1965     Requesting physician: Jose Copeland          Assessment and Plan:     (Z86.73) History of stroke  (primary encounter diagnosis)  Comment: The patient had a left lacunar infarct in the left thalamus in March of this year with almost complete resolution of symptoms.  I would expect that at times of great strain to the system he could have reappearance of some of the stroke symptoms.  We discussed the importance of new and different acute onset stroke symptoms leading him straight to the closest emergency department for intervention and he voiced understanding.    I discussed with him that the etiology of most lacunar strokes are small vessel ischemic strokes that are related to untreated hypertension, hyperlipidemia and in some cases diabetes although not in his case.  His hemoglobin A1c has been normal.  I would strongly encourage him to seek more aggressive treatment for his hypertension.  Systolic blood pressure goal is consistently under 140 although I prefer to see it under 135 and diastolic blood pressure goal consistently under 90 again preferable to be under 85.  Whether this means an increase in losartan, and addition of a diuretic or beta-blocker I will defer to his primary care physician.    The patient's LDL cholesterol goal is less than 100 and again lower is better.  He has had a dramatic drop in cholesterol levels with the initiation of atorvastatin and his primary care physician could think about decreasing that dose down to 20 mg tablets once a day and rechecking the levels  to make sure it continues to be under 100.    The patient should have a repeat ALLEGRA in November of this year to see if it was just a false positive or transient positive.  At this time I do not think he needs hematology referral for discussion of anticoagulation due to the classic lacunar appearance on MRI scan and plenty of risk factors to focus on.  If a second stroke were to occur or we find out that the genetic disorder his father has is autosomal dominant that may well change that recommendation.  The patient will look into it further and see if he can find more details about his father's diagnosis.    I aloo reviewed with the patient that I feel it is safe for him to get a COVID vaccine. The blood clots reported with vaccine are occurring at a lower rate than blood clots associated with COVID infection so I think the vaccine would be the safer choice. He can chose to avoid the J&J vaccine which has a higher reported CVT issue.    Today I spent 50 minutes caring for the patient including chart review of his hospital stay and review of all test results listed below.  Follow-up with neurology only as needed.  I am happy to answer questions via [employer] as necessary.    Bety Gutierrez MD             History of Present Illness:     chief complaint:   Chief Complaint   Patient presents with     New Patient     Referred per Dr. Willson - FREDIS due to embolism of cerbral artery in March 2021        El Singh is a 56 year old patient presenting for assessment of stroke that was diagnosed in [place] on 3/28/2021.  The patient reports that he had right hand numbness at first.  The numbness was also noted on the right side of his face and right leg.  He also had some dizziness or lightheadedness associated with the symptoms.  They were fluctuating a little bit starting in the middle of the night upon awakening and then when he woke up later that morning.  They then got better as he played a little golf and then  proceeded to get worse again.    With the worsening while playing golf he decided to present to his local emergency room in [place] and he was diagnosed with a left thalamic lacunar infarct that appeared to be acute/subacute which was fitting with his symptoms.  At the time he had CT angiography of the head and neck that showed no significant stenosis of the heart.  He was outside of an interventional window and did not receive TPA administration.    The patient reports he is 100% recovered from symptoms although a few times he has a reemergence of right-handed numbness or sensory disturbance.  Does not present as severe as the initial stroke symptoms.  1 of these led him to present to the emergency department where he had a repeat MRI scan of the brain that showed a subacute stroke in the same region.  It was felt to be recrudescence of stroke symptoms.  This was after he had just driven across country twice back from [place].    Prior to the stroke the patient was not taking any antiplatelet therapy or treating hyperlipidemia or hypertension.  He is now taking aspirin 325 mg daily.  He is also taking atorvastatin 40 mg daily and his LDL lipid level has dropped from 136 at the time of initial hospital stay down down to 65.  A few years ago in the [employer] system his LDL was 156.    The patient is taking losartan 50 mg daily he is measuring his blood pressure at home and reports his systolic blood pressures are around 140 diastolic has more variance and is ranging between 88 and 96.  He has been resistant to accepting an increase in the dose or change in medication from his primary care physician and was hoping to treat his high blood pressure with exercise.    His family history significant for hypertension on both sides of the family and his father had some sort of genetic hypercoagulable state but it was not a factor V Leiden.  There is also a cousin.  He will try to research the name.  He did have a hyper  coag work-up done while in the hospital and it came back negative except for a positive ALLEGRA that was 1:320 homogenous pattern.  Phospholipid antibodies lupus anticoagulant were all felt to be negative per testing.    He has been diagnosed with sleep apnea and is not currently treating because he has not previously tolerated CPAP machine.         Physical Exam:     deferred         Pertinent history/data   4/2/2021  ALLEGRA Titer 1 1:320  Homogenous pattern   Beta microglobulin negative  ESR 7  Phospholipid Ab IgM and G normal <9.4  Protein S Ag, Free 164 (H)  Protein C AG 82  Lupus anticoagulant negative    Component      Latest Ref Rng & Units 4/27/2021 6/24/2021   WBC      4.0 - 11.0 10e9/L 5.1    RBC Count      4.4 - 5.9 10e12/L 5.15    Hemoglobin      13.3 - 17.7 g/dL 15.3    Hematocrit      40.0 - 53.0 % 44.8    MCV      78 - 100 fl 87    MCH      26.5 - 33.0 pg 29.7    MCHC      31.5 - 36.5 g/dL 34.2    RDW      10.0 - 15.0 % 12.8    Platelet Count      150 - 450 10e9/L 218    Diff Method       Automated Method    % Neutrophils      % 52.9    % Lymphocytes      % 36.2    % Monocytes      % 6.1    % Eosinophils      % 3.6    % Basophils      % 0.8    % Immature Granulocytes      % 0.4    Nucleated RBCs      0 /100 0    Absolute Neutrophil      1.6 - 8.3 10e9/L 2.7    Absolute Lymphocytes      0.8 - 5.3 10e9/L 1.8    Absolute Monocytes      0.0 - 1.3 10e9/L 0.3    Absolute Eosinophils      0.0 - 0.7 10e9/L 0.2    Absolute Basophils      0.0 - 0.2 10e9/L 0.0    Abs Immature Granulocytes      0 - 0.4 10e9/L 0.0    Absolute Nucleated RBC       0.0    Cholesterol      <200 mg/dL  156   Triglycerides      <150 mg/dL  60   HDL Cholesterol      >39 mg/dL  74   LDL Cholesterol Calculated      <100 mg/dL  70   Non HDL Cholesterol      <130 mg/dL  82   Glucose      70 - 99 mg/dL 95    LDL Cholesterol Direct      <100 mg/dL 65      Echo with bubble  Conclusions:   Normal LV size and normal LV systolic function with EF is 60%.    There is mild concentric left ventricular hypertrophy.   Normal diastolic function.   The right ventricle is normal in size, thickness and function.   The left and right atria are normal in size.   There is mild mitral regurgitation.   There is trace to mild tricuspid regurgitation.   RVSP 24 mmHg.   Bubble study completed with no evidence of interatrial shunt.   ___________________________________________________________________    Zio Patch 14 days:  Mn HR 47, Max 171 bpm  Avg 78 bpm  Predominant rhythm is insus, 9 SVT runs longest 17 beats    MRI BRAIN WITHOUT AND WITH CONTRAST  4/27/2021 3:06 PM      HISTORY: Left thalamic CVA in March 2021, right-sided numbness  resolved, new onset recurrent right arm numbness.      TECHNIQUE: Multiplanar, multisequence MRI of the brain without and  with 9 mL Gadavist.      COMPARISON: None.      FINDINGS: Small 3 mm area of increased signal on the DWI in the left  thalamus without low signal on ADC suggesting T2 shine-through. There  is also T2 hyperintense signal in this area. This is concerning for a  region of subacute infarct. No other areas of restricted diffusion  appreciated. No mass effect or midline shift. No evidence of acute  intracranial hemorrhage. Ventricular size is within normal limits  without evidence of hydrocephalus. No abnormal extra axial fluid  collection. Mild patchy deep and subcortical white matter T2  hyperintensities which are nonspecific, but likely related to chronic  microvascular ischemic disease.     There is no abnormal intracranial enhancement.      The facial structures appear normal. The major arterial T2 flow voids  at the base of the brain appear patent.                                                                       IMPRESSION:    1. Small 3 mm area of T2 shine-through on the left thalamus on the DWI  images suggesting region of subacute infarct. No previous images are  available at the time of this dictation. No additional areas  of  restricted diffusion to suggest acute infarcts.  2. No mass effect or midline shift. No evidence of acute intracranial  hemorrhage.  3. Additional mild nonspecific white matter changes, possibly due to  chronic microvascular ischemic disease.     MIGUEL LUJAN MD      Again, thank you for allowing me to participate in the care of your patient.      Sincerely,    Bety Gutierrez MD

## 2021-07-26 NOTE — PROGRESS NOTES
Brent is a 56 year old who is being evaluated via a billable video visit.      How would you like to obtain your AVS? MyChart  If the video visit is dropped, the invitation should be resent by: Text to cell phone: [phone]  Will anyone else be joining your video visit? Yes - Wife Val      Video Start Time: 15:56  Video-Visit Details    Type of service:  Video Visit    Video End Time: 16:28PM    Originating Location (pt. Location): Home    Distant Location (provider location):  [facility] NEUROLOGY [facility] [place]     Platform used for Video Visit: [facility]    El Sinhg MRN# [id number]   Age: 56 year old YOB: 1965     Requesting physician: Jose Copeland            Assessment and Plan:     (Z86.73) History of stroke  (primary encounter diagnosis)  Comment: The patient had a left lacunar infarct in the left thalamus in March of this year with almost complete resolution of symptoms.  I would expect that at times of great strain to the system he could have reappearance of some of the stroke symptoms.  We discussed the importance of new and different acute onset stroke symptoms leading him straight to the closest emergency department for intervention and he voiced understanding.    I discussed with him that the etiology of most lacunar strokes are small vessel ischemic strokes that are related to untreated hypertension, hyperlipidemia and in some cases diabetes although not in his case.  His hemoglobin A1c has been normal.  I would strongly encourage him to seek more aggressive treatment for his hypertension.  Systolic blood pressure goal is consistently under 140 although I prefer to see it under 135 and diastolic blood pressure goal consistently under 90 again preferable to be under 85.  Whether this means an increase in losartan, and addition of a diuretic or beta-blocker I will defer to his primary care physician.    The patient's LDL  cholesterol goal is less than 100 and again lower is better.  He has had a dramatic drop in cholesterol levels with the initiation of atorvastatin and his primary care physician could think about decreasing that dose down to 20 mg tablets once a day and rechecking the levels to make sure it continues to be under 100.    The patient should have a repeat ALLEGRA in November of this year to see if it was just a false positive or transient positive.  At this time I do not think he needs hematology referral for discussion of anticoagulation due to the classic lacunar appearance on MRI scan and plenty of risk factors to focus on.  If a second stroke were to occur or we find out that the genetic disorder his father has is autosomal dominant that may well change that recommendation.  The patient will look into it further and see if he can find more details about his father's diagnosis.    I aloo reviewed with the patient that I feel it is safe for him to get a COVID vaccine. The blood clots reported with vaccine are occurring at a lower rate than blood clots associated with COVID infection so I think the vaccine would be the safer choice. He can chose to avoid the J&J vaccine which has a higher reported CVT issue.    Today I spent 50 minutes caring for the patient including chart review of his hospital stay and review of all test results listed below.  Follow-up with neurology only as needed.  I am happy to answer questions via [employer] as necessary.    Bety Gutierrez MD             History of Present Illness:     chief complaint:   Chief Complaint   Patient presents with     New Patient     Referred per Dr. Willson - FREDIS due to embolism of cerbral artery in March 2021        El Singh is a 56 year old patient presenting for assessment of stroke that was diagnosed in [place] on 3/28/2021.  The patient reports that he had right hand numbness at first.  The numbness was also noted on the right side of his face and right  leg.  He also had some dizziness or lightheadedness associated with the symptoms.  They were fluctuating a little bit starting in the middle of the night upon awakening and then when he woke up later that morning.  They then got better as he played a little golf and then proceeded to get worse again.    With the worsening while playing golf he decided to present to his local emergency room in [place] and he was diagnosed with a left thalamic lacunar infarct that appeared to be acute/subacute which was fitting with his symptoms.  At the time he had CT angiography of the head and neck that showed no significant stenosis of the heart.  He was outside of an interventional window and did not receive TPA administration.    The patient reports he is 100% recovered from symptoms although a few times he has a reemergence of right-handed numbness or sensory disturbance.  Does not present as severe as the initial stroke symptoms.  1 of these led him to present to the emergency department where he had a repeat MRI scan of the brain that showed a subacute stroke in the same region.  It was felt to be recrudescence of stroke symptoms.  This was after he had just driven across country twice back from [place].    Prior to the stroke the patient was not taking any antiplatelet therapy or treating hyperlipidemia or hypertension.  He is now taking aspirin 325 mg daily.  He is also taking atorvastatin 40 mg daily and his LDL lipid level has dropped from 136 at the time of initial hospital stay down down to 65.  A few years ago in the [place] system his LDL was 156.    The patient is taking losartan 50 mg daily he is measuring his blood pressure at home and reports his systolic blood pressures are around 140 diastolic has more variance and is ranging between 88 and 96.  He has been resistant to accepting an increase in the dose or change in medication from his primary care physician and was hoping to treat his high blood pressure  with exercise.    His family history significant for hypertension on both sides of the family and his father had some sort of genetic hypercoagulable state but it was not a factor V Leiden.  There is also a cousin.  He will try to research the name.  He did have a hyper coag work-up done while in the hospital and it came back negative except for a positive ALLEGRA that was 1:320 homogenous pattern.  Phospholipid antibodies lupus anticoagulant were all felt to be negative per testing.    He has been diagnosed with sleep apnea and is not currently treating because he has not previously tolerated CPAP machine.         Physical Exam:     deferred         Pertinent history/data   4/2/2021  ALLEGRA Titer 1 1:320  Homogenous pattern   Beta microglobulin negative  ESR 7  Phospholipid Ab IgM and G normal <9.4  Protein S Ag, Free 164 (H)  Protein C AG 82  Lupus anticoagulant negative    Component      Latest Ref Rng & Units 4/27/2021 6/24/2021   WBC      4.0 - 11.0 10e9/L 5.1    RBC Count      4.4 - 5.9 10e12/L 5.15    Hemoglobin      13.3 - 17.7 g/dL 15.3    Hematocrit      40.0 - 53.0 % 44.8    MCV      78 - 100 fl 87    MCH      26.5 - 33.0 pg 29.7    MCHC      31.5 - 36.5 g/dL 34.2    RDW      10.0 - 15.0 % 12.8    Platelet Count      150 - 450 10e9/L 218    Diff Method       Automated Method    % Neutrophils      % 52.9    % Lymphocytes      % 36.2    % Monocytes      % 6.1    % Eosinophils      % 3.6    % Basophils      % 0.8    % Immature Granulocytes      % 0.4    Nucleated RBCs      0 /100 0    Absolute Neutrophil      1.6 - 8.3 10e9/L 2.7    Absolute Lymphocytes      0.8 - 5.3 10e9/L 1.8    Absolute Monocytes      0.0 - 1.3 10e9/L 0.3    Absolute Eosinophils      0.0 - 0.7 10e9/L 0.2    Absolute Basophils      0.0 - 0.2 10e9/L 0.0    Abs Immature Granulocytes      0 - 0.4 10e9/L 0.0    Absolute Nucleated RBC       0.0    Cholesterol      <200 mg/dL  156   Triglycerides      <150 mg/dL  60   HDL Cholesterol      >39 mg/dL   74   LDL Cholesterol Calculated      <100 mg/dL  70   Non HDL Cholesterol      <130 mg/dL  82   Glucose      70 - 99 mg/dL 95    LDL Cholesterol Direct      <100 mg/dL 65      Echo with bubble  Conclusions:   Normal LV size and normal LV systolic function with EF is 60%.   There is mild concentric left ventricular hypertrophy.   Normal diastolic function.   The right ventricle is normal in size, thickness and function.   The left and right atria are normal in size.   There is mild mitral regurgitation.   There is trace to mild tricuspid regurgitation.   RVSP 24 mmHg.   Bubble study completed with no evidence of interatrial shunt.   ___________________________________________________________________    Zio Patch 14 days:  Mn HR 47, Max 171 bpm  Avg 78 bpm  Predominant rhythm is insus, 9 SVT runs longest 17 beats    MRI BRAIN WITHOUT AND WITH CONTRAST  4/27/2021 3:06 PM      HISTORY: Left thalamic CVA in March 2021, right-sided numbness  resolved, new onset recurrent right arm numbness.      TECHNIQUE: Multiplanar, multisequence MRI of the brain without and  with 9 mL Gadavist.      COMPARISON: None.      FINDINGS: Small 3 mm area of increased signal on the DWI in the left  thalamus without low signal on ADC suggesting T2 shine-through. There  is also T2 hyperintense signal in this area. This is concerning for a  region of subacute infarct. No other areas of restricted diffusion  appreciated. No mass effect or midline shift. No evidence of acute  intracranial hemorrhage. Ventricular size is within normal limits  without evidence of hydrocephalus. No abnormal extra axial fluid  collection. Mild patchy deep and subcortical white matter T2  hyperintensities which are nonspecific, but likely related to chronic  microvascular ischemic disease.     There is no abnormal intracranial enhancement.      The facial structures appear normal. The major arterial T2 flow voids  at the base of the brain appear patent.                                                                        IMPRESSION:    1. Small 3 mm area of T2 shine-through on the left thalamus on the DWI  images suggesting region of subacute infarct. No previous images are  available at the time of this dictation. No additional areas of  restricted diffusion to suggest acute infarcts.  2. No mass effect or midline shift. No evidence of acute intracranial  hemorrhage.  3. Additional mild nonspecific white matter changes, possibly due to  chronic microvascular ischemic disease.     MIGUEL LUJAN MD

## 2021-09-18 ENCOUNTER — HEALTH MAINTENANCE LETTER (OUTPATIENT)
Age: 56
End: 2021-09-18

## 2022-02-10 ENCOUNTER — OFFICE VISIT (OUTPATIENT)
Dept: FAMILY MEDICINE | Facility: CLINIC | Age: 57
End: 2022-02-10
Payer: COMMERCIAL

## 2022-02-10 VITALS
BODY MASS INDEX: 33.56 KG/M2 | SYSTOLIC BLOOD PRESSURE: 144 MMHG | OXYGEN SATURATION: 97 % | TEMPERATURE: 97.6 F | DIASTOLIC BLOOD PRESSURE: 102 MMHG | HEIGHT: 69 IN | RESPIRATION RATE: 18 BRPM | WEIGHT: 226.6 LBS | HEART RATE: 71 BPM

## 2022-02-10 DIAGNOSIS — I10 HYPERTENSION GOAL BP (BLOOD PRESSURE) < 140/90: ICD-10-CM

## 2022-02-10 DIAGNOSIS — J32.9 SINUSITIS, UNSPECIFIED CHRONICITY, UNSPECIFIED LOCATION: Primary | ICD-10-CM

## 2022-02-10 PROCEDURE — 99213 OFFICE O/P EST LOW 20 MIN: CPT | Performed by: PHYSICIAN ASSISTANT

## 2022-02-10 ASSESSMENT — PAIN SCALES - GENERAL: PAINLEVEL: NO PAIN (0)

## 2022-02-10 ASSESSMENT — MIFFLIN-ST. JEOR: SCORE: 1830.29

## 2022-02-10 NOTE — PROGRESS NOTES
Assessment & Plan       ICD-10-CM    1. Sinusitis, unspecified chronicity, unspecified location  J32.9 amoxicillin-clavulanate (AUGMENTIN) 875-125 MG tablet   2. Hypertension goal BP (blood pressure) < 140/90  I10    Warning signs discussed.  side effects discussed  Symptomatic treatment: such as fluids,  OTC acetaminophen and /or non-steroidal anti-inflammatory medication.  Follow up  2-4 wks for blood pressure check once he is back on his meds.     Return in about 2 weeks (around 2/24/2022) for BP Recheck.    EMIL Blancas [facility]   Brent is a 57 year old who presents for the following health issues     History of Present Illness       He eats 0-1 servings of fruits and vegetables daily.He consumes 0 sweetened beverage(s) daily.He exercises with enough effort to increase his heart rate 20 to 29 minutes per day.  He exercises with enough effort to increase his heart rate 3 or less days per week. He is missing 2 dose(s) of medications per week.       Acute Illness  Acute illness concerns: Cough  Onset/Duration: 3 weeks  Symptoms:  Fever: YES- 1 day   Chills/Sweats: YES- 1 day   Headache (location?): YES-  Occasional   Sinus Pressure: YES  Conjunctivitis:  no  Ear Pain: no  Rhinorrhea: YES  Congestion: YES  Sore Throat: YES- x 2 days   Cough: YES-productive of yellow sputum, productive of green sputum, worsening over time- almost to the point of vomiting. Worse in evening and a.m.   Wheeze: YES- when heavy feeling in chest   Decreased Appetite: YES  Nausea: YES- with heavy cough   Vomiting: no  Diarrhea: no  Dysuria/Freq.: no  Dysuria or Hematuria: no  Fatigue/Achiness: YES  Sick/Strep Exposure: wife has cold symptoms   Therapies tried and outcome: alcaseltzer plus, mucinex night time, cough drops   COVID last week Neg at home.     Has not taken regular prescription medications x 3 weeks due to light headedness.   Stopped during illness.          Review of  "Systems   Constitutional, HEENT, cardiovascular, pulmonary, gi and gu systems are negative, except as otherwise noted.      Objective    BP (!) 144/102   Pulse 71   Temp 97.6  F (36.4  C) (Oral)   Resp 18   Ht 1.74 m (5' 8.5\")   Wt 102.8 kg (226 lb 9.6 oz)   SpO2 97%   BMI 33.95 kg/m    Body mass index is 33.95 kg/m .  Physical Exam   GENERAL: healthy, alert and no distress  Head: Normocephalic, atraumatic.  Eyes: Conjunctiva clear, non icteric. PERRLA.  Ears: External ears and TMs normal BL.  Nasal congestion with posterior nasal drainage. mild maxillary tenderness.   Mouth / Throat: Normal dentition.  No oral lesions. Pharynx non erythematous, tonsils without hypertrophy.  Neck: Supple, no enlarged LN, trachea midline.   RESP: lungs clear to auscultation - no rales, rhonchi or wheezes  CV: regular rate and rhythm, normal S1 S2, no S3 or S4, no murmur, click or rub, no peripheral edema    "

## 2022-04-11 ENCOUNTER — TRANSFERRED RECORDS (OUTPATIENT)
Dept: HEALTH INFORMATION MANAGEMENT | Facility: CLINIC | Age: 57
End: 2022-04-11
Payer: COMMERCIAL

## 2022-05-20 ENCOUNTER — TELEPHONE (OUTPATIENT)
Dept: FAMILY MEDICINE | Facility: CLINIC | Age: 57
End: 2022-05-20
Payer: COMMERCIAL

## 2022-05-20 NOTE — TELEPHONE ENCOUNTER
Patient Quality Outreach    Patient is due for the following:   Hypertension -  Hypertension follow-up visit  Physical  - Due after 6/4/22    NEXT STEPS:   Schedule a yearly physical    Type of outreach:    Sent [employer] message.      Questions for provider review:    None     Chloe Smith MA

## 2022-08-20 ENCOUNTER — HEALTH MAINTENANCE LETTER (OUTPATIENT)
Age: 57
End: 2022-08-20

## 2022-11-20 ENCOUNTER — HEALTH MAINTENANCE LETTER (OUTPATIENT)
Age: 57
End: 2022-11-20

## 2023-01-03 ENCOUNTER — TELEPHONE (OUTPATIENT)
Dept: FAMILY MEDICINE | Facility: CLINIC | Age: 58
End: 2023-01-03

## 2023-01-04 ENCOUNTER — OFFICE VISIT (OUTPATIENT)
Dept: FAMILY MEDICINE | Facility: CLINIC | Age: 58
End: 2023-01-04
Payer: COMMERCIAL

## 2023-01-04 VITALS
OXYGEN SATURATION: 97 % | RESPIRATION RATE: 17 BRPM | BODY MASS INDEX: 32.92 KG/M2 | SYSTOLIC BLOOD PRESSURE: 134 MMHG | TEMPERATURE: 97.3 F | HEIGHT: 68 IN | WEIGHT: 217.2 LBS | DIASTOLIC BLOOD PRESSURE: 83 MMHG | HEART RATE: 72 BPM

## 2023-01-04 DIAGNOSIS — R55 NEAR SYNCOPE: Primary | ICD-10-CM

## 2023-01-04 LAB — D DIMER PPP FEU-MCNC: 0.35 UG/ML FEU (ref 0–0.5)

## 2023-01-04 PROCEDURE — 99213 OFFICE O/P EST LOW 20 MIN: CPT | Performed by: FAMILY MEDICINE

## 2023-01-04 PROCEDURE — 85379 FIBRIN DEGRADATION QUANT: CPT | Performed by: FAMILY MEDICINE

## 2023-01-04 PROCEDURE — 36415 COLL VENOUS BLD VENIPUNCTURE: CPT | Performed by: FAMILY MEDICINE

## 2023-01-04 ASSESSMENT — PAIN SCALES - GENERAL: PAINLEVEL: NO PAIN (0)

## 2023-01-04 NOTE — PATIENT INSTRUCTIONS
Keep your stress test appointment and let me know after the test so I can look up the result.    2.  Set up brain MRI - [phone].    3.  Buy apple watch (make sure it has EKG) or [employer]. Check during your episodes and send me the result via My Chart.    4. I will contact you with results and we will go from there. If you have severe symptoms, please go to the ER.

## 2023-01-04 NOTE — PROGRESS NOTES
Brent is a 57 year old male here for ER follow up:      Please note: only the issues listed at the bottom under Assessment and Plan are addressed today. The rest of the medical problems are listed for the sake of completeness.      Lightheadedness - started end of Dec 2022. Worse with activity like stairs, moving furniture. Feels intermittent lightheaded (difficult for him to describe but both loss of balance and near syncope), weak, nauseated. During these episodes he reports heart racing but not irregular. Also during these episodes he checked his blood pressure and found it to be not low. In between episodes he feels fine. Left shoulder which he feels when moving the left shoulder (he has seen ortho for this previously). He denies injury. No chest pain or shortness of breath. Denies recent travel or other prolonged inactivity.  Evaluation and treatment:   The history of CVA is noted.   In June 2021 we did a Zio as part of his CVA workup and a short SVT run was noted.   The family history of blood clots is noted.   Had not been taking ASA but restarted since the above symptoms.    The left shoulder pain represents an orthopedic issue, not new. Not an angina equivalent.   ER 1/1/23 - EKG was fine. CXR was fine. Labs were fine including trop, hgb, they ordered outpatient stress echo which is set up in about 10 days.   At this time the dx is not clear but the diff dx includes CNS problems like repeat CVA, central vertigo, a fib, SVT or similar, PE.   D dimer fine as below.   My advice is: get the stress echo ordered by the ER, get a home cardiac monitor and send results during an episode, get brain MRI.          Results for orders placed or performed in visit on 01/04/23   D dimer, quantitative     Status: Normal   Result Value Ref Range    D-Dimer Quantitative 0.35 0.00 - 0.50 ug/mL FEU    Narrative    This D-dimer assay is intended for use in conjunction with a clinical pretest probability assessment model to  "exclude pulmonary embolism (PE) and deep venous thrombosis (DVT) in outpatients suspected of PE or DVT. The cut-off value is 0.50 ug/mL FEU.            Skin check - he previously wanted referral to a specific dermatologist.   I previously placed the referral.    Previous CVA - presented in March 2021 with right facial, right arm and right leg numbness. Later in April it recurred. Since then no symptoms. He feels fine.  Evaluation and treatment:    Hospitalized in [place] 3/28 to 3/29/21 - MRI 8 mm lacunar infarct central left thalamus. CTA negative. TTE with bubble negative. Hypercoag w/u showed ALLEGRA 1:320 homogenous pattern, negative anticardiolipin, negative lupus anticoagulant, negative beta 2 microglobulin, elevated protein S activity, low antithrombin activity, normal protein C activity.   He was discharged in  mg daily.   ER in [place] 4/27/21- repeat MRI same area of stroke but 3 mm this time.   In June 2021 we did a Zio as part of his CVA workup and a short SVT run was noted.   I previously referred him to neurology.     Family history of blood clots and factor V leiden mutation - see FH. No personal history of blood clots. But in March 2021 he had a stroke as above.  Evaluation and treatment:    Negative for factor V leiden mutation on 4/15/14.    Now just restarted his  mg qd.    He has some abnormalities on hypercoag w/u as above. To be addressed by neurology.    Positive ALLEGRA -   Found as part of hypercoag w/u - see above.   By itself the meaning is questionable.   We will assess symptoms periodically.    HTN - checking at home - around 130-140 systolic. Fairly controlled in clinic.  Evaluation and treatment:   The history of CVA is noted.   Obesity and sleep apnea contribute - see below.   Losartan 50 mg daily. No side effects. He stopped this citing \"my blood pressure is normal.\"   I suggested a goal of 130/80 or less - he should monitor at home and restart based on this goal.    BP " "Readings from Last 6 Encounters:   01/04/23 134/83   02/10/22 (!) 144/102   06/24/21 129/88   06/04/21 (!) 148/92   04/27/21 136/89   11/06/19 (!) 146/76        Last Comprehensive Metabolic Panel:  Sodium   Date Value Ref Range Status   06/24/2021 141 133 - 144 mmol/L Final     Potassium   Date Value Ref Range Status   06/24/2021 4.7 3.4 - 5.3 mmol/L Final     Chloride   Date Value Ref Range Status   06/24/2021 109 94 - 109 mmol/L Final     Carbon Dioxide   Date Value Ref Range Status   06/24/2021 27 20 - 32 mmol/L Final     Anion Gap   Date Value Ref Range Status   06/24/2021 5 3 - 14 mmol/L Final     Glucose   Date Value Ref Range Status   06/24/2021 98 70 - 99 mg/dL Final     Comment:     Fasting specimen     Urea Nitrogen   Date Value Ref Range Status   06/24/2021 21 7 - 30 mg/dL Final     Creatinine   Date Value Ref Range Status   06/24/2021 0.93 0.66 - 1.25 mg/dL Final     GFR Estimate   Date Value Ref Range Status   06/24/2021 >90 >60 mL/min/[1.73_m2] Final     Comment:     Non  GFR Calc  Starting 12/18/2018, serum creatinine based estimated GFR (eGFR) will be   calculated using the Chronic Kidney Disease Epidemiology Collaboration   (CKD-EPI) equation.       Calcium   Date Value Ref Range Status   06/24/2021 9.0 8.5 - 10.1 mg/dL Final       ADHD - Symptoms are poor his attention, focus, easy distractibility, insomnia and mood swings.   Evaluation and treatment:    dx is based on evaluation by [facility] on 3/11, 3/18 and 3/27/13.    Adderall - no longer taking. We will not restart given history of stroke.    Insomnia - stable on Melatonin prn.    Shoulder and hip pain - following with ortho. Injections and PT have been helpful.     Dyslipidemia - No history of CAD, PAD or diabetes. But he has had CVA as above.   Evaluation and treatment:    Needs moderate to high intensity statin.   Lipitor 40 mg daily started in [place] after his stroke - no side effects. Not taking, citing \"my " "cholesterol is fine.\"   I spent some time discussing the rationale for statin - he will let me know if he changes his mind.   Diet and exercise discussed.     Recent Labs   Lab Test 06/24/21  0957 04/27/21  1213 10/22/19  0857 05/04/18  0907 04/15/15  0941   CHOL 156  --  237*   < > 244*   HDL 74  --  64   < > 74   LDL 70 65 155*   < > 155*   TRIG 60  --  91   < > 77   CHOLHDLRATIO  --   --   --   --  3.3    < > = values in this interval not displayed.     Sleep apnea -   Evaluation and treatment:    He did not tolerate CPAP    Obesity -   Evaluation and treatment:    Previously declined nutrition referral.    Discussed diet and exercise.    Body mass index is 32.73 kg/m .     Wt Readings from Last 5 Encounters:   01/04/23 98.5 kg (217 lb 3.2 oz)   02/10/22 102.8 kg (226 lb 9.6 oz)   06/24/21 100.7 kg (222 lb)   06/04/21 99.3 kg (219 lb)   04/27/21 93 kg (205 lb)     Vitamin D insufficiency - Vit D 2/27/13 showed borderline low level. He is not consistent with taking over the counter Vit D 1000 units daily    ED - erections have been poor for a while. They are unreliable. Libido is fine.  Evaluation and treatment:    No contraindications to PDE5 inhibitors.   Generic Sildenafil prn.    Onychomycosis - on great toes.  Evaluation and treatment:    Referred previously to podiatry.    Preventive: Declines Shingrix. Declines covid vaccine. Declines flu shot.    Immunization History   Administered Date(s) Administered     COVID-19 Vaccine 12+ (Pfizer) 08/04/2021, 08/25/2021     Influenza (IIV3) PF 09/21/2010     Influenza (intradermal) 03/06/2013     Influenza Vaccine >6 months (Alfuria,Fluzone) 11/26/2013     TD (ADULT, 7+) 01/16/2006     TDAP Vaccine (Adacel) 03/06/2013     Td (Adult), Adsorbed 04/28/2012     Colonoscopy: 5/7/14 - repeat in 10 years    Hep C: Neg 4/15/14    STD screen - declines    Screening for prostate cancer - discussed controversy.    PSA   Date Value Ref Range Status   06/24/2021 0.83 0 - 4 ug/L " "Final     Comment:     Assay Method:  Chemiluminescence using Siemens Vista analyzer     Advanced directive: he will send a copy from home.    SH:    Marital status:   Kids: 4  Employment: construction and real estate  Exercise: golfing, walking  Tobacco: no  Etoh: 6-8 per week  Recreational drugs: no  Caffeine: one per day    FH:    Dad had PE and had factor V leiden. Cousin had blood clot in her legs and had factor V leiden.     Exam:    /83   Pulse 72   Temp 97.3  F (36.3  C) (Tympanic)   Resp 17   Ht 1.735 m (5' 8.31\")   Wt 98.5 kg (217 lb 3.2 oz)   SpO2 97%   BMI 32.73 kg/m      Gen: Healthy appearing male in no acute distress.   HENT: TM's normal. Oropharynx normal. Oral mucosa moist without lesions. Hearing intact to finger rub. [place]-Hallpike maneuver negative.   Eyes: Conjunctiva and sclera normal. Pupils react normally to light. No nystagmus. Fundoscopic exam shows normal retinal vessels with sharp disc margins.   Neck: No enlarged lymph nodes, thyromegally or other masses.   Lungs: Good air movement and otherwise clear.   CV: Heart RRR with no murmurs. No JVD, carotid bruits or leg edema.   Neuro: pt is alert and oriented. CN II-XII intact. Strength 5/5 in , biceps, quads, hams. Sensation intact upper and lower extremeties to soft touch. Gait is normal.     Assessment and Plan - Decision Making    1. Near syncope    Per HPI    - D dimer, quantitative  - MR Brain w/o Contrast; Future      Written instructions given as follows:    Patient Instructions   1. Keep your stress test appointment and let me know after the test so I can look up the result.    2.  Set up brain MRI - [phone].    3.  Buy apple watch (make sure it has EKG) or [employer]. Check during your episodes and send me the result via My Chart.    4. I will contact you with results and we will go from there. If you have severe symptoms, please go to the ER.      "

## 2023-01-05 NOTE — RESULT ENCOUNTER NOTE
Cheryl Kennedy,    The test for blood clot was normal which is good news.    Please follow the instructions I gave you in clinic including checking your heart during your episodes so we can catch any rhythm problems.    Regards,    Jose Willson M.D.

## 2023-01-06 ENCOUNTER — ANCILLARY PROCEDURE (OUTPATIENT)
Dept: MRI IMAGING | Facility: CLINIC | Age: 58
End: 2023-01-06
Attending: FAMILY MEDICINE
Payer: COMMERCIAL

## 2023-01-06 DIAGNOSIS — R55 NEAR SYNCOPE: ICD-10-CM

## 2023-01-06 PROCEDURE — 70551 MRI BRAIN STEM W/O DYE: CPT | Mod: TC | Performed by: RADIOLOGY

## 2023-01-20 ENCOUNTER — VIRTUAL VISIT (OUTPATIENT)
Dept: FAMILY MEDICINE | Facility: CLINIC | Age: 58
End: 2023-01-20
Payer: COMMERCIAL

## 2023-01-20 DIAGNOSIS — R55 NEAR SYNCOPE: Primary | ICD-10-CM

## 2023-01-20 DIAGNOSIS — R94.39 ABNORMAL STRESS ECG: ICD-10-CM

## 2023-01-20 PROCEDURE — 99214 OFFICE O/P EST MOD 30 MIN: CPT | Mod: 95 | Performed by: FAMILY MEDICINE

## 2023-01-20 NOTE — PROGRESS NOTES
Brent is a 57 year old who is being evaluated via a billable video visit.      How would you like to obtain your AVS? MyChart  If the video visit is dropped, the invitation should be resent by: Send to e-mail at: [email or web address]  Will anyone else be joining your video visit? No          Brent is a [age]year old male on video for follow up:      Please note: only the issues listed at the bottom under Assessment and Plan are addressed today. The rest of the medical problems are listed for the sake of completeness.      Near syncope and abnormal stress test - lightheadedness started end of Dec 2022. Worse with activity like stairs, moving furniture. Feels intermittent lightheaded (difficult for him to describe but both loss of balance and near syncope), weak, nauseated. During these episodes he reports heart racing but not irregular. Also during these episodes he checked his blood pressure and found it to be not low. In between episodes he feels fine. Left shoulder which he feels when moving the left shoulder (he has seen ortho for this previously). He denies injury. No chest pain or shortness of breath. Denies recent travel or other prolonged inactivity.  Evaluation and treatment:   The history of CVA is noted.   In June 2021 we did a Zio as part of his CVA workup and a short SVT run was noted.   The family history of blood clots is noted.   Had not been taking ASA but restarted since the above symptoms.    The left shoulder pain represents an orthopedic issue, not new. Not an angina equivalent.   ER 1/1/23 - EKG was fine. CXR was fine. Labs were fine including trop, hgb, they ordered outpatient stress echo (this was done 1/18/23 - see below)   D dimer negative 1/4/23   Brain MRI 1/6/23 nothing acute.   At this time the dx is most likely PSVT, PAFIB or similar   Today's visit was prompted by the stress test ordered by the ER done on 1/18/23 - the echo part was fine. But the EKG part was abnormal.   My advice  "previously: get a home cardiac monitor and send results during an episode - he has not done that but he plans to do that now.   I also am referring him to cardiology to comment on the abnormal stress EKG and his possible rhythm issue.    Skin check - he previously wanted referral to a specific dermatologist.   I previously placed the referral.    Previous CVA - presented in March 2021 with right facial, right arm and right leg numbness. Later in April it recurred. Since then no symptoms. He feels fine.  Evaluation and treatment:    Hospitalized in [place] 3/28 to 3/29/21 - MRI 8 mm lacunar infarct central left thalamus. CTA negative. TTE with bubble negative. Hypercoag w/u showed ALLEGRA 1:320 homogenous pattern, negative anticardiolipin, negative lupus anticoagulant, negative beta 2 microglobulin, elevated protein S activity, low antithrombin activity, normal protein C activity.   He was discharged in  mg daily.   ER in [place] 4/27/21- repeat MRI same area of stroke but 3 mm this time.   In June 2021 we did a Zio as part of his CVA workup and a short SVT run was noted.   I previously referred him to neurology.     Family history of blood clots and factor V leiden mutation - see FH. No personal history of blood clots. But in March 2021 he had a stroke as above.  Evaluation and treatment:    Negative for factor V leiden mutation on 4/15/14.    Now just restarted his  mg qd.    He has some abnormalities on hypercoag w/u as above. To be addressed by neurology.    Positive ALLEGRA -   Found as part of hypercoag w/u - see above.   By itself the meaning is questionable.   We will assess symptoms periodically.    HTN - checking at home - around 130-140 systolic. Fairly controlled in clinic.  Evaluation and treatment:   The history of CVA is noted.   Obesity and sleep apnea contribute - see below.   Losartan 50 mg daily. No side effects. He stopped this citing \"my blood pressure is normal.\"   I suggested a goal " of 130/80 or less - he should monitor at home and restart based on this goal.    BP Readings from Last 6 Encounters:   01/04/23 134/83   02/10/22 (!) 144/102   06/24/21 129/88   06/04/21 (!) 148/92   04/27/21 136/89   11/06/19 (!) 146/76        Last Comprehensive Metabolic Panel:  Sodium   Date Value Ref Range Status   06/24/2021 141 133 - 144 mmol/L Final     Potassium   Date Value Ref Range Status   06/24/2021 4.7 3.4 - 5.3 mmol/L Final     Chloride   Date Value Ref Range Status   06/24/2021 109 94 - 109 mmol/L Final     Carbon Dioxide   Date Value Ref Range Status   06/24/2021 27 20 - 32 mmol/L Final     Anion Gap   Date Value Ref Range Status   06/24/2021 5 3 - 14 mmol/L Final     Glucose   Date Value Ref Range Status   06/24/2021 98 70 - 99 mg/dL Final     Comment:     Fasting specimen     Urea Nitrogen   Date Value Ref Range Status   06/24/2021 21 7 - 30 mg/dL Final     Creatinine   Date Value Ref Range Status   06/24/2021 0.93 0.66 - 1.25 mg/dL Final     GFR Estimate   Date Value Ref Range Status   06/24/2021 >90 >60 mL/min/[1.73_m2] Final     Comment:     Non  GFR Calc  Starting 12/18/2018, serum creatinine based estimated GFR (eGFR) will be   calculated using the Chronic Kidney Disease Epidemiology Collaboration   (CKD-EPI) equation.       Calcium   Date Value Ref Range Status   06/24/2021 9.0 8.5 - 10.1 mg/dL Final       ADHD - Symptoms are poor his attention, focus, easy distractibility, insomnia and mood swings.   Evaluation and treatment:    dx is based on evaluation by [facility] on 3/11, 3/18 and 3/27/13.    Adderall - no longer taking. We will not restart given history of stroke.    Insomnia - stable on Melatonin prn.    Shoulder and hip pain - following with ortho. Injections and PT have been helpful.     Dyslipidemia - No history of CAD, PAD or diabetes. But he has had CVA as above.   Evaluation and treatment:    Needs moderate to high intensity statin.   Lipitor 40 mg daily  "started in [place] after his stroke - no side effects. Not taking, citing \"my cholesterol is fine.\"   I spent some time discussing the rationale for statin - he will let me know if he changes his mind.   Diet and exercise discussed.     Recent Labs   Lab Test 06/24/21  0957 04/27/21  1213 10/22/19  0857 05/04/18  0907 04/15/15  0941   CHOL 156  --  237*   < > 244*   HDL 74  --  64   < > 74   LDL 70 65 155*   < > 155*   TRIG 60  --  91   < > 77   CHOLHDLRATIO  --   --   --   --  3.3    < > = values in this interval not displayed.     Sleep apnea -   Evaluation and treatment:    He did not tolerate CPAP    Obesity -   Evaluation and treatment:    Previously declined nutrition referral.    Discussed diet and exercise.    There is no height or weight on file to calculate BMI.     Wt Readings from Last 5 Encounters:   01/04/23 98.5 kg (217 lb 3.2 oz)   02/10/22 102.8 kg (226 lb 9.6 oz)   06/24/21 100.7 kg (222 lb)   06/04/21 99.3 kg (219 lb)   04/27/21 93 kg (205 lb)     Vitamin D insufficiency - Vit D 2/27/13 showed borderline low level. He is not consistent with taking over the counter Vit D 1000 units daily    ED - erections have been poor for a while. They are unreliable. Libido is fine.  Evaluation and treatment:    No contraindications to PDE5 inhibitors.   Generic Sildenafil prn.    Onychomycosis - on great toes.  Evaluation and treatment:    Referred previously to podiatry.    Preventive: Declines Shingrix. Declines covid vaccine. Declines flu shot.    Immunization History   Administered Date(s) Administered     COVID-19 Vaccine 12+ (Pfizer) 08/04/2021, 08/25/2021     Influenza (IIV3) PF 09/21/2010     Influenza (intradermal) 03/06/2013     Influenza Vaccine >6 months (Alfuria,Fluzone) 11/26/2013     TD (ADULT, 7+) 01/16/2006     TDAP Vaccine (Adacel) 03/06/2013     Td (Adult), Adsorbed 04/28/2012     Colonoscopy: 5/7/14 - repeat in 10 years    Hep C: Neg 4/15/14    STD screen - declines    Screening for " prostate cancer - discussed controversy.    PSA   Date Value Ref Range Status   06/24/2021 0.83 0 - 4 ug/L Final     Comment:     Assay Method:  Chemiluminescence using Siemens Vista analyzer     Advanced directive: he will send a copy from home.    SH:    Marital status:   Kids: 4  Employment: construction and real estate  Exercise: golfing, walking  Tobacco: no  Etoh: 6-8 per week  Recreational drugs: no  Caffeine: one per day    FH:    Dad had PE and had factor V leiden. Cousin had blood clot in her legs and had factor V leiden.     Exam:    There were no vitals taken for this visit.    Gen: on video, healthy appearing male in no acute distress.     Assessment and Plan - Decision Making    1. Near syncope    Per HPI    - Adult Cardiology Eval  Referral; Future    2. Abnormal stress ECG    Per HPI    - Adult Cardiology Eval  Referral; Future      Video-Visit Details    Type of service:  Video Visit     Originating Location (pt. Location): Home  Distant Location (provider location):  On-site  Platform used for Video Visit: Jane

## 2023-02-01 ENCOUNTER — OFFICE VISIT (OUTPATIENT)
Dept: CARDIOLOGY | Facility: CLINIC | Age: 58
End: 2023-02-01
Attending: FAMILY MEDICINE
Payer: COMMERCIAL

## 2023-02-01 VITALS
DIASTOLIC BLOOD PRESSURE: 95 MMHG | BODY MASS INDEX: 33.39 KG/M2 | WEIGHT: 221.6 LBS | HEART RATE: 70 BPM | OXYGEN SATURATION: 100 % | SYSTOLIC BLOOD PRESSURE: 138 MMHG

## 2023-02-01 DIAGNOSIS — R00.2 PALPITATIONS: ICD-10-CM

## 2023-02-01 DIAGNOSIS — R55 NEAR SYNCOPE: ICD-10-CM

## 2023-02-01 DIAGNOSIS — R06.09 DYSPNEA ON EXERTION: ICD-10-CM

## 2023-02-01 DIAGNOSIS — R42 DIZZINESS: ICD-10-CM

## 2023-02-01 DIAGNOSIS — R94.39 ABNORMAL STRESS ECG: ICD-10-CM

## 2023-02-01 DIAGNOSIS — R94.39 ABNORMAL CARDIOVASCULAR STRESS TEST: Primary | ICD-10-CM

## 2023-02-01 PROCEDURE — 99205 OFFICE O/P NEW HI 60 MIN: CPT | Performed by: INTERNAL MEDICINE

## 2023-02-01 RX ORDER — ASPIRIN 81 MG/1
243 TABLET, CHEWABLE ORAL ONCE
Status: CANCELLED | OUTPATIENT
Start: 2023-02-01

## 2023-02-01 RX ORDER — ASPIRIN 325 MG
325 TABLET ORAL ONCE
Status: CANCELLED | OUTPATIENT
Start: 2023-02-01 | End: 2023-02-01

## 2023-02-01 RX ORDER — SODIUM CHLORIDE 9 MG/ML
INJECTION, SOLUTION INTRAVENOUS CONTINUOUS
Status: CANCELLED | OUTPATIENT
Start: 2023-02-01

## 2023-02-01 RX ORDER — LIDOCAINE 40 MG/G
CREAM TOPICAL
Status: CANCELLED | OUTPATIENT
Start: 2023-02-01

## 2023-02-01 NOTE — NURSING NOTE
Cardiac Monitors: Patient was instructed regarding the indication, function, care and prompt return of a holter Zio 7 days monitor. The monitor was placed on the patient with instructions regarding care of the skin electrodes and monitor, as well as documentation in the patient diary. Patient demonstrated understanding of this information and agreed to call with further questions or concerns.  Will wear it after angiogram  Left Heart Catheterization: Patient given Coronary Angiogram and Angioplasty: A Patient's Guide booklet. Patient was instructed regarding left heart catheterization, including discussion of the indication, procedure, preparation, intra-procedural steps, and recovery at home. Patient demonstrated understanding of this information and agreed to call with further questions or concerns. Scheduled for 2/6-instructions also given via avs  Return Appointment: Patient given instructions regarding scheduling next clinic visit. Patient demonstrated understanding of this information and agreed to call with further questions or concerns.  Scheduled   Patient stated he understood all health information given and agreed to call with further questions or concerns.

## 2023-02-01 NOTE — PATIENT INSTRUCTIONS
Thank you for coming to the [facility] at [place]; please note the following instructions:    Coronary Angiogram  Zio heart monitor to be worn for 7 days  Monitor BP twice daily for 1 week  Follow-up with Dr. Brizuela in 6 weeks    You [facility] a [facility] [address] [place] [address], [place] [address].   Please arrive to the [facility] Waiting Room on Monday, Feb 6th at 12:30 pm  Please plan on being at the hospital all day.  At any time, emergencies and/or urgent cases may come up which could delay the start of your procedure.    Visitor Policy: Two visitors.    When you arrive you will get labs first then please recheck in to the [facility] lobby.  You will then be escorted back to the pre procedure area called 2A. Here they will insert an IV, draw labs, and obtain a short medical history. Please bring an updated list of your current medications.    A provider will come and talk with you about the procedure and obtain consent.    A nurse from the Cardiac Catheterization Lab will then escort you to the procedure area. You will be receiving sedation during the procedure so you will need someone to drive you to and from the hospital.    After the procedure you will recover for a short period (2 - 6 hours). You will be discharged with instructions for post procedure care. However, depending on what the angiogram shows you may have to have stents placed and this might require an overnight stay. We ask that you bring a small bag of necessities for your comfort if you would need to stay overnight. DO NOT BRING ANY VALUABLES!      Pre procedure instructions:  1. Make arrangements to have a  as you will not be allowed to drive following the procedure. Someone should stay with you the night after your procedure.  Pre-procedure instructions - Coronary Angiogram  Shower in the evening before or the morning of the procedure  Take your temperature in  the morning prior to coming in.  If your temperature is 100 F please call [phone] (Opt. 1) and notify them.  If you do not have access to a thermometer at home, please come in for testing.  If you are running a temperature your procedure may be rescheduled.  Nothing to eat or drink 8 hours prior to your arrival  You can take your morning medications (except for diabetic and blood thinners) with sips of water.  Take 325 mg of Asprin 24 hours prior to the procedure and the morning of procedure.   You will need to arrange a ride to drop you off and pick you up, as you will be unable to drive home.  Prior to discharge you may be required to lay flat for approximately 2-4 hours in the recovery unit to ensure proper clotting of the artery.              Diabetic Medication Instructions  DO NOT take any oral diabetic medication, short-acting diabetes medications/insulin, humalog or regular insulin the morning of your test  Take   dose of long-acting insulin (Lantus, Levemir) the day of your test  Hold Oral Diabetic on the day of the procedure and for 48 hours after IV contrast given  Remember to  bring your glucometer and insulin with you to take after your test if needed  3. Do not drink alcohol or smoke 24 hours prior to test.    Post Procedure Instructions:  For 24 hours:    Have an adult stay with you for 24 hours.    Relax and take it easy.    Drink plenty of fluids.    You may eat your normal diet, unless your doctor tells you otherwise.    Do NOT make any important or legal decisions.    Do NOT drive or operate machines at home or at work.    Do NOT drink alcohol.    Do NOT smoke.     Medicines:    If you have begun Plavix (clopidogrel), Effient (prasugrel), or Brilinta (ticagrelor), do not stop taking it until you talk to your heart doctor (cardiologist).     If you are on metformin (Glucophage), do not restart it until you have blood tests (within 2 to 3 days after discharge). When your doctor tells you it is  safe, you may restart the metformin.    If you have stopped any other medicines, check with your nurse or provider about when to restart them.     Care of wrist or arm site:    It is normal to have soreness at the puncture site and mild tingling in your hand for up to 3 days.    Remove the Band-Aid after 24 hours. If there is minor oozing, apply another Band-aid and remove it after 12 hours.    Do NOT take a bath, or use a hot tub or pool for the next 48 hours. You may shower.    It is normal to have a small bruise. There should not be a lump at the site.    Do not scrub the site.    Do not use lotion or powder near the puncture site for 3 days.     Activity Restrictions    For 2 days, do not use your hand or arm to support your weight (such as rising from a chair) or bend your wrist  (such as lifting a garage door).    For 2 days, do not lift more than 5 pounds or exercise your arm (tennis, golf or bowling).       If you start bleeding from the site in your arm: Sit down and press firmly on the site with your fingers for 10 minutes.  Call your doctor as soon as you can.  Call 911 right away if you have bleeding that is heavy or does not stop.  Call your doctor if:    You have a large or growing hard lump around the site.    The site is red, swollen, hot or tender.              If you have further questions, please utilize [employer] to contact us.   If your question concerns the above instructions, contact:  Vani Christy RN  Electrophysiology Nurse Coordinator.  [phone]          If you have any questions regarding your visit, please contact your care team:     CARDIOLOGY  TELEPHONE NUMBER   Vani THIBODEAUX, Registered Nurse  Letty SANTIAGO, Registered Nurse  Linsey BANKS, Registered Medical Assistant  Holly OSULLIVAN, Certified Medical Assistant  Laurel SCHWARTZ, Visit Facilitator [phone] (select option 1)    *After hours: [phone]   For Scheduling Appts:     [phone] (select option 1)    *After hours: [phone]   For the Device  Clinic (Pacemakers and ICD's)  Ashley HE, Registered Nurse   During business hours: [phone]    *After business hours:  [phone] (select option 4)      Normal test result notifications will be released via Mod[employer] or mailed within 7 business days.  All other test results, will be communicated via telephone once reviewed by your cardiologist.    If you need a medication refill, please contact your pharmacy.  Please allow 3 business days for your refill to be completed.    As always, thank you for trusting us with your health care needs!

## 2023-02-01 NOTE — LETTER
2/1/2023      RE: El Singh  [address] [facility] [address]       Dear Colleague,    Thank you for the opportunity to participate in the care of your patient, El Singh, at the [facility]. Please see a copy of my visit note below.                                                                                               General Cardiology Clinic-[place]      Referring provider:Jose Willson MD    HPI: Mr. El Singh is a 58 year old  male with PMH significant for    -History of CVA  -Hypertension  -Hyperlipidemia    Patient is being seen today at request of Dr. Garcia for abnormal stress echocardiogram.    Patient is accompanied by his wife.  He tells me that over the last 1 month he feels extremely fatigued with any physical activity like climbing stairs or snowblowing.  He feels lightheaded and palpitations even climbing stairs.  He has noticed shortness of breath with exertion and mild left-sided chest discomfort.  All these are new to him.  Denies any of these symptoms at rest.  Today when he was coming from the parking lot he felt sided chest/arm discomfort  Exercise test echocardiogram 1/18/2023 was negative for inducible wall motion abnormalities however patient developed 1 to 2 mm horizontal to upsloping ST segment elevation in the inferolateral leads.  He did not report chest pain.  Resting echocardiogram showed normal biventricular function with no valve disease.  This was done at outside hospital.  Patient has no prior cardiac history.  He has a history of CVA in 2021.  He was started on losartan but then he discontinued it after a month or so.  He tells me that his blood pressure is normal at home (120-130/83 mmHg). No history of diabetes.  He quit smoking 30 years ago.  No alcohol.    Current medications:  Atorvastatin 40 mg  Aspirin 325    Medications, personal, family, and social  history reviewed with patient and revised.    PAST MEDICAL HISTORY:  No past medical history on file.    CURRENT     Current Outpatient Medications   Medication Sig Dispense Refill     aspirin (ASA) 325 MG EC tablet Take 325 mg by mouth every 6 hours as needed for moderate pain (4-6)       atorvastatin (LIPITOR) 40 MG tablet Take 1 tablet (40 mg) by mouth daily 90 tablet 3     losartan (COZAAR) 50 MG tablet Take 1 tablet (50 mg) by mouth daily 90 tablet 3     Multiple Vitamin (MULTI-VITAMIN PO) Take 1 tablet by mouth daily         PAST SURGICAL HISTORY:  Past Surgical History:   Procedure Laterality Date     CIRCUMCISION,CLAMP      Adult     COLONOSCOPY  5/7/2014    Procedure: COLONOSCOPY;  Surgeon: Brian Trinh MD;  Location: [facility] OR     ENT SURGERY       HC OPEN TX NOSE FX+OPEN FIX SEPTUM       REMOVAL OF SPERM DUCT(S)      & Reversal       ALLERGIES:   No Known Allergies    FAMILY HISTORY:  Family History   Problem Relation Age of Onset     Heart Disease Father         dvt     Blood Disease Father         clots on coumadin     Blood Disease Other         clots on coumadin         SOCIAL HISTORY:  Social History     Tobacco Use     Smoking status: Never     Smokeless tobacco: Never   Vaping Use     Vaping Use: Never used   Substance Use Topics     Alcohol use: No     Comment: quit      Drug use: No       ROS:   Constitutional: No fever, chills, or sweats. Weight stable.   Cardiovascular: As per HPI.       Exam:  BP (!) 138/95 (BP Location: Left arm, Patient Position: Sitting, Cuff Size: Adult Large)   Pulse 70   Wt 100.5 kg (221 lb 9.6 oz)   SpO2 100%   BMI 33.39 kg/m    GENERAL APPEARANCE: alert and no distress  HEENT: no icterus, no central cyanosis  LYMPH/NECK: no adenopathy, no asymmetry, JVP not elevated  RESPIRATORY: lungs clear to auscultation - no rales, rhonchi or wheezes, no use of accessory muscles, no retractions, respirations are unlabored, normal respiratory rate  CARDIOVASCULAR:  regular rhythm, normal S1, S2, no S3 or S4 and no murmur, click or rub, precordium quiet with normal PMI.  GI: soft, non tender  EXTREMITIES: no edema  NEURO: alert, normal speech,and affect  SKIN: no ecchymoses, no rashes     I have reviewed the labs and personally reviewed the imaging below and made my comment in the assessment and plan.    Labs:  CBC RESULTS:   Lab Results   Component Value Date    WBC 5.1 04/27/2021    RBC 5.15 04/27/2021    HGB 15.3 04/27/2021    HCT 44.8 04/27/2021    MCV 87 04/27/2021    MCH 29.7 04/27/2021    MCHC 34.2 04/27/2021    RDW 12.8 04/27/2021     04/27/2021       BMP RESULTS:  Lab Results   Component Value Date     06/24/2021    POTASSIUM 4.7 06/24/2021    CHLORIDE 109 06/24/2021    CO2 27 06/24/2021    ANIONGAP 5 06/24/2021    GLC 98 06/24/2021    BUN 21 06/24/2021    CR 0.93 06/24/2021    GFRESTIMATED >90 06/24/2021    GFRESTBLACK >90 06/24/2021    PILI 9.0 06/24/2021 1/18/23 Stress Echo      * Stress Echo is negative for inducible wall motion abnormalities.   * Stress EKG is positive for ischemic changes.  There is 1- 2mm ST segment  horizontal to upsloping ST segment elevation in the inferolateral leads.   * No chest pain noted.   * 12 MET and 107 % max predicted heart rate (MPHR) achieved.   * Average aerobic capacity.   * RESTING ECHO.   * The left ventricular systolic function is normal, estimated LVEF 60-65%.      EKG 4/27/2021 shows sinus rhythm otherwise normal.          Assessment and Plan:     #Abnormal stress test without stress induced wall motion abnormality but with ST segment changes  #History of chest discomfort, palpitations, dizziness, fatigue over the last 1 month  -Patient has multiple cardiovascular risk factors.  Cannot rule out obstructive CAD.  Recommend coronary angiogram.    #Hypertension ?  -Patient is not on any blood pressure medications (he was on losartan then he self discontinued as he reports normal blood pressure at  home).  -Recommended regular home monitoring.  I will review his blood pressure trend when I see him next time.    #History of CVA  -On aspirin and atorvastatin.  LDL is well controlled.    #Exertional palpitations over the last 1 month  -I reviewed his apple watch EKG tracings which shows short episodes of SVT.  Recommended Zio patch for a week.    Return to clinic after coronary angiogram.    Total time spent today for this visit is 66 minutes including precharting, face-to-face clinic visit, review of labs/imaging and medical documentation.    Please donot hesitate to contact me if you have any questions or concerns. Again, thank you for allowing me to participate in the care of your patient.    Kerline GRIMM MD  [facility] Division of Cardiology  Pager [phone]

## 2023-02-01 NOTE — PROGRESS NOTES
[facility]      Referring provider:Jose Willson MD    HPI: Mr. El Singh is a 58 year old  male with PMH significant for    -History of CVA  -Hypertension  -Hyperlipidemia    Patient is being seen today at request of Dr. Garcia for abnormal stress echocardiogram.    Patient is accompanied by his wife.  He tells me that over the last 1 month he feels extremely fatigued with any physical activity like climbing stairs or snowblowing.  He feels lightheaded and palpitations even climbing stairs.  He has noticed shortness of breath with exertion and mild left-sided chest discomfort.  All these are new to him.  Denies any of these symptoms at rest.  Today when he was coming from the parking lot he felt sided chest/arm discomfort  Exercise test echocardiogram 1/18/2023 was negative for inducible wall motion abnormalities however patient developed 1 to 2 mm horizontal to upsloping ST segment elevation in the inferolateral leads.  He did not report chest pain.  Resting echocardiogram showed normal biventricular function with no valve disease.  This was done at outside hospital.  Patient has no prior cardiac history.  He has a history of CVA in 2021.  He was started on losartan but then he discontinued it after a month or so.  He tells me that his blood pressure is normal at home (120-130/83 mmHg). No history of diabetes.  He quit smoking 30 years ago.  No alcohol.    Current medications:  Atorvastatin 40 mg  Aspirin 325    Medications, personal, family, and social history reviewed with patient and revised.    PAST MEDICAL HISTORY:  No past medical history on file.    CURRENT     Current Outpatient Medications   Medication Sig Dispense Refill     aspirin (ASA) 325 MG EC tablet Take 325 mg by mouth every 6 hours as needed for moderate pain (4-6)       atorvastatin (LIPITOR) 40 MG tablet Take 1 tablet  (40 mg) by mouth daily 90 tablet 3     losartan (COZAAR) 50 MG tablet Take 1 tablet (50 mg) by mouth daily 90 tablet 3     Multiple Vitamin (MULTI-VITAMIN PO) Take 1 tablet by mouth daily         PAST SURGICAL HISTORY:  Past Surgical History:   Procedure Laterality Date     CIRCUMCISION,CLAMP      Adult     COLONOSCOPY  5/7/2014    Procedure: COLONOSCOPY;  Surgeon: Brian Trinh MD;  Location: [facility] OR     ENT SURGERY       HC OPEN TX NOSE FX+OPEN FIX SEPTUM       REMOVAL OF SPERM DUCT(S)      & Reversal       ALLERGIES:   No Known Allergies    FAMILY HISTORY:  Family History   Problem Relation Age of Onset     Heart Disease Father         dvt     Blood Disease Father         clots on coumadin     Blood Disease Other         clots on coumadin         SOCIAL HISTORY:  Social History     Tobacco Use     Smoking status: Never     Smokeless tobacco: Never   Vaping Use     Vaping Use: Never used   Substance Use Topics     Alcohol use: No     Comment: quit      Drug use: No       ROS:   Constitutional: No fever, chills, or sweats. Weight stable.   Cardiovascular: As per HPI.       Exam:  BP (!) 138/95 (BP Location: Left arm, Patient Position: Sitting, Cuff Size: Adult Large)   Pulse 70   Wt 100.5 kg (221 lb 9.6 oz)   SpO2 100%   BMI 33.39 kg/m    GENERAL APPEARANCE: alert and no distress  HEENT: no icterus, no central cyanosis  LYMPH/NECK: no adenopathy, no asymmetry, JVP not elevated  RESPIRATORY: lungs clear to auscultation - no rales, rhonchi or wheezes, no use of accessory muscles, no retractions, respirations are unlabored, normal respiratory rate  CARDIOVASCULAR: regular rhythm, normal S1, S2, no S3 or S4 and no murmur, click or rub, precordium quiet with normal PMI.  GI: soft, non tender  EXTREMITIES: no edema  NEURO: alert, normal speech,and affect  SKIN: no ecchymoses, no rashes     I have reviewed the labs and personally reviewed the imaging below and made my comment in the assessment and  plan.    Labs:  CBC RESULTS:   Lab Results   Component Value Date    WBC 5.1 04/27/2021    RBC 5.15 04/27/2021    HGB 15.3 04/27/2021    HCT 44.8 04/27/2021    MCV 87 04/27/2021    MCH 29.7 04/27/2021    MCHC 34.2 04/27/2021    RDW 12.8 04/27/2021     04/27/2021       BMP RESULTS:  Lab Results   Component Value Date     06/24/2021    POTASSIUM 4.7 06/24/2021    CHLORIDE 109 06/24/2021    CO2 27 06/24/2021    ANIONGAP 5 06/24/2021    GLC 98 06/24/2021    BUN 21 06/24/2021    CR 0.93 06/24/2021    GFRESTIMATED >90 06/24/2021    GFRESTBLACK >90 06/24/2021    PILI 9.0 06/24/2021 1/18/23 Stress Echo      * Stress Echo is negative for inducible wall motion abnormalities.   * Stress EKG is positive for ischemic changes.  There is 1- 2mm ST segment  horizontal to upsloping ST segment elevation in the inferolateral leads.   * No chest pain noted.   * 12 MET and 107 % max predicted heart rate (MPHR) achieved.   * Average aerobic capacity.   * RESTING ECHO.   * The left ventricular systolic function is normal, estimated LVEF 60-65%.      EKG 4/27/2021 shows sinus rhythm otherwise normal.          Assessment and Plan:     #Abnormal stress test without stress induced wall motion abnormality but with ST segment changes  #History of chest discomfort, palpitations, dizziness, fatigue over the last 1 month  -Patient has multiple cardiovascular risk factors.  Cannot rule out obstructive CAD.  Recommend coronary angiogram.    #Hypertension ?  -Patient is not on any blood pressure medications (he was on losartan then he self discontinued as he reports normal blood pressure at home).  -Recommended regular home monitoring.  I will review his blood pressure trend when I see him next time.    #History of CVA  -On aspirin and atorvastatin.  LDL is well controlled.    #Exertional palpitations over the last 1 month  -I reviewed his apple watch EKG tracings which shows short episodes of SVT.  Recommended Zio patch for a  week.    Return to clinic after coronary angiogram.    Total time spent today for this visit is 66 minutes including precharting, face-to-face clinic visit, review of labs/imaging and medical documentation.    Please donot hesitate to contact me if you have any questions or concerns. Again, thank you for allowing me to participate in the care of your patient.    Kerline GRIMM MD  [facility] Division of Cardiology  Pager [phone]

## 2023-02-01 NOTE — NURSING NOTE
"Chief Complaint   Patient presents with     New Patient     Dx/Sx: Near Syncope; Abnormal stress test       Initial BP (!) 138/95 (BP Location: Left arm, Patient Position: Sitting, Cuff Size: Adult Large)   Pulse 70   Wt 100.5 kg (221 lb 9.6 oz)   SpO2 100%   BMI 33.39 kg/m   Estimated body mass index is 33.39 kg/m  as calculated from the following:    Height as of 1/4/23: 1.735 m (5' 8.31\").    Weight as of this encounter: 100.5 kg (221 lb 9.6 oz)..  BP completed using cuff size: KARLENE Villavicencio    "

## 2023-02-03 ENCOUNTER — TELEPHONE (OUTPATIENT)
Dept: CARDIOLOGY | Facility: CLINIC | Age: 58
End: 2023-02-03
Payer: COMMERCIAL

## 2023-02-03 NOTE — TELEPHONE ENCOUNTER
Left voicemail to remind patient of Cardiac Cath Lab appointment on 2/6 and inform patient of updated Visitor Policy.

## 2023-02-06 ENCOUNTER — HOSPITAL ENCOUNTER (OUTPATIENT)
Facility: CLINIC | Age: 58
Discharge: HOME | End: 2023-02-06
Attending: INTERNAL MEDICINE | Admitting: INTERNAL MEDICINE
Payer: COMMERCIAL

## 2023-02-06 ENCOUNTER — APPOINTMENT (OUTPATIENT)
Dept: LAB | Facility: CLINIC | Age: 58
End: 2023-02-06
Attending: INTERNAL MEDICINE
Payer: COMMERCIAL

## 2023-02-06 ENCOUNTER — APPOINTMENT (OUTPATIENT)
Dept: MEDSURG UNIT | Facility: CLINIC | Age: 58
End: 2023-02-06
Attending: INTERNAL MEDICINE
Payer: COMMERCIAL

## 2023-02-06 VITALS
TEMPERATURE: 97.7 F | SYSTOLIC BLOOD PRESSURE: 138 MMHG | OXYGEN SATURATION: 95 % | HEIGHT: 69 IN | DIASTOLIC BLOOD PRESSURE: 94 MMHG | BODY MASS INDEX: 32.24 KG/M2 | WEIGHT: 217.7 LBS | RESPIRATION RATE: 12 BRPM | HEART RATE: 60 BPM

## 2023-02-06 DIAGNOSIS — R55 NEAR SYNCOPE: ICD-10-CM

## 2023-02-06 DIAGNOSIS — R94.39 ABNORMAL STRESS ECG: ICD-10-CM

## 2023-02-06 PROBLEM — Z98.890 STATUS POST CORONARY ANGIOGRAM: Status: ACTIVE | Noted: 2023-02-06

## 2023-02-06 LAB
ANION GAP SERPL CALCULATED.3IONS-SCNC: 13 MMOL/L (ref 7–15)
BUN SERPL-MCNC: 11.8 MG/DL (ref 8–23)
CALCIUM SERPL-MCNC: 9.2 MG/DL (ref 8.6–10)
CHLORIDE SERPL-SCNC: 104 MMOL/L (ref 98–107)
CREAT SERPL-MCNC: 0.77 MG/DL (ref 0.67–1.17)
DEPRECATED HCO3 PLAS-SCNC: 22 MMOL/L (ref 22–29)
ERYTHROCYTE [DISTWIDTH] IN BLOOD BY AUTOMATED COUNT: 12.6 % (ref 10–15)
GFR SERPL CREATININE-BSD FRML MDRD: >90 ML/MIN/1.73M2
GLUCOSE SERPL-MCNC: 95 MG/DL (ref 70–99)
HCT VFR BLD AUTO: 45.3 % (ref 40–53)
HGB BLD-MCNC: 15.1 G/DL (ref 13.3–17.7)
INR PPP: 0.98 (ref 0.85–1.15)
MCH RBC QN AUTO: 28.7 PG (ref 26.5–33)
MCHC RBC AUTO-ENTMCNC: 33.3 G/DL (ref 31.5–36.5)
MCV RBC AUTO: 86 FL (ref 78–100)
PLATELET # BLD AUTO: 241 10E3/UL (ref 150–450)
POTASSIUM SERPL-SCNC: 4.3 MMOL/L (ref 3.4–5.3)
RBC # BLD AUTO: 5.27 10E6/UL (ref 4.4–5.9)
SODIUM SERPL-SCNC: 139 MMOL/L (ref 136–145)
WBC # BLD AUTO: 5.2 10E3/UL (ref 4–11)

## 2023-02-06 PROCEDURE — 272N000001 HC OR GENERAL SUPPLY STERILE: Performed by: INTERNAL MEDICINE

## 2023-02-06 PROCEDURE — 93010 ELECTROCARDIOGRAM REPORT: CPT | Performed by: INTERNAL MEDICINE

## 2023-02-06 PROCEDURE — 999N000054 HC STATISTIC EKG NON-CHARGEABLE

## 2023-02-06 PROCEDURE — 99152 MOD SED SAME PHYS/QHP 5/>YRS: CPT | Performed by: INTERNAL MEDICINE

## 2023-02-06 PROCEDURE — 99153 MOD SED SAME PHYS/QHP EA: CPT | Performed by: INTERNAL MEDICINE

## 2023-02-06 PROCEDURE — C1894 INTRO/SHEATH, NON-LASER: HCPCS | Performed by: INTERNAL MEDICINE

## 2023-02-06 PROCEDURE — 999N000142 HC STATISTIC PROCEDURE PREP ONLY

## 2023-02-06 PROCEDURE — 250N000009 HC RX 250: Performed by: INTERNAL MEDICINE

## 2023-02-06 PROCEDURE — 80048 BASIC METABOLIC PNL TOTAL CA: CPT | Performed by: INTERNAL MEDICINE

## 2023-02-06 PROCEDURE — 99152 MOD SED SAME PHYS/QHP 5/>YRS: CPT | Mod: GC | Performed by: INTERNAL MEDICINE

## 2023-02-06 PROCEDURE — 36415 COLL VENOUS BLD VENIPUNCTURE: CPT | Performed by: INTERNAL MEDICINE

## 2023-02-06 PROCEDURE — 93454 CORONARY ARTERY ANGIO S&I: CPT | Performed by: INTERNAL MEDICINE

## 2023-02-06 PROCEDURE — 250N000011 HC RX IP 250 OP 636: Performed by: INTERNAL MEDICINE

## 2023-02-06 PROCEDURE — C1887 CATHETER, GUIDING: HCPCS | Performed by: INTERNAL MEDICINE

## 2023-02-06 PROCEDURE — 258N000003 HC RX IP 258 OP 636: Performed by: INTERNAL MEDICINE

## 2023-02-06 PROCEDURE — 85610 PROTHROMBIN TIME: CPT | Performed by: INTERNAL MEDICINE

## 2023-02-06 PROCEDURE — 250N000013 HC RX MED GY IP 250 OP 250 PS 637: Performed by: INTERNAL MEDICINE

## 2023-02-06 PROCEDURE — 93005 ELECTROCARDIOGRAM TRACING: CPT

## 2023-02-06 PROCEDURE — 93454 CORONARY ARTERY ANGIO S&I: CPT | Mod: 26 | Performed by: INTERNAL MEDICINE

## 2023-02-06 PROCEDURE — 93248 EXT ECG>7D<15D REV&INTERPJ: CPT | Performed by: INTERNAL MEDICINE

## 2023-02-06 PROCEDURE — 999N000134 HC STATISTIC PP CARE STAGE 3

## 2023-02-06 PROCEDURE — 85027 COMPLETE CBC AUTOMATED: CPT | Performed by: INTERNAL MEDICINE

## 2023-02-06 RX ORDER — SODIUM CHLORIDE 9 MG/ML
INJECTION, SOLUTION INTRAVENOUS CONTINUOUS
Status: DISCONTINUED | OUTPATIENT
Start: 2023-02-06 | End: 2023-02-06 | Stop reason: HOSPADM

## 2023-02-06 RX ORDER — NITROGLYCERIN 5 MG/ML
VIAL (ML) INTRAVENOUS
Status: DISCONTINUED | OUTPATIENT
Start: 2023-02-06 | End: 2023-02-06 | Stop reason: HOSPADM

## 2023-02-06 RX ORDER — HEPARIN SODIUM 1000 [USP'U]/ML
INJECTION, SOLUTION INTRAVENOUS; SUBCUTANEOUS
Status: DISCONTINUED | OUTPATIENT
Start: 2023-02-06 | End: 2023-02-06 | Stop reason: HOSPADM

## 2023-02-06 RX ORDER — OXYCODONE HYDROCHLORIDE 10 MG/1
10 TABLET ORAL EVERY 4 HOURS PRN
Status: DISCONTINUED | OUTPATIENT
Start: 2023-02-06 | End: 2023-02-06 | Stop reason: HOSPADM

## 2023-02-06 RX ORDER — LIDOCAINE 40 MG/G
CREAM TOPICAL
Status: DISCONTINUED | OUTPATIENT
Start: 2023-02-06 | End: 2023-02-06 | Stop reason: HOSPADM

## 2023-02-06 RX ORDER — NICARDIPINE HYDROCHLORIDE 2.5 MG/ML
INJECTION INTRAVENOUS
Status: DISCONTINUED | OUTPATIENT
Start: 2023-02-06 | End: 2023-02-06 | Stop reason: HOSPADM

## 2023-02-06 RX ORDER — OXYCODONE HYDROCHLORIDE 5 MG/1
5 TABLET ORAL EVERY 4 HOURS PRN
Status: DISCONTINUED | OUTPATIENT
Start: 2023-02-06 | End: 2023-02-06 | Stop reason: HOSPADM

## 2023-02-06 RX ORDER — ASPIRIN 81 MG/1
243 TABLET, CHEWABLE ORAL ONCE
Status: COMPLETED | OUTPATIENT
Start: 2023-02-06 | End: 2023-02-06

## 2023-02-06 RX ORDER — ASPIRIN 325 MG
325 TABLET ORAL ONCE
Status: COMPLETED | OUTPATIENT
Start: 2023-02-06 | End: 2023-02-06

## 2023-02-06 RX ORDER — FENTANYL CITRATE 50 UG/ML
INJECTION, SOLUTION INTRAMUSCULAR; INTRAVENOUS
Status: DISCONTINUED | OUTPATIENT
Start: 2023-02-06 | End: 2023-02-06 | Stop reason: HOSPADM

## 2023-02-06 RX ADMIN — ASPIRIN 325 MG ORAL TABLET 325 MG: 325 PILL ORAL at 13:51

## 2023-02-06 RX ADMIN — SODIUM CHLORIDE: 9 INJECTION, SOLUTION INTRAVENOUS at 13:52

## 2023-02-06 ASSESSMENT — ACTIVITIES OF DAILY LIVING (ADL)
ADLS_ACUITY_SCORE: 35

## 2023-02-06 NOTE — Clinical Note
LCA Cine(s)  injected and visualized utilizing power injector system.Rate (mL/sec) 3 Total Volume (mL) 6

## 2023-02-06 NOTE — Clinical Note
LCA Cine(s)  injected and visualized utilizing power injector system.Rate (mL/sec) 4 Total Volume (mL) 10

## 2023-02-06 NOTE — PROGRESS NOTES
D/I/A: Pt roomed on 3C in bay 35.  Arrived via litter and accompanied by RN Akil On/Off: Off monitor.  VSSA.  Rhythm upon arrival NSR on monitor.  Denies pain or sob.  Reviewed activity restrictions and when to notify RN, ie-changes to breathing or increased chest pressure or chest pain.  CCL access: R radial wrist with TR band with 12cc's of air, CDI, CMS+.  P: Continue to monitor status.  Discharge to home once meeting criteria.

## 2023-02-06 NOTE — DISCHARGE INSTRUCTIONS
Going Home after an Angioplasty or Stent Placement (Cardiac)        After you go home:  Have an adult stay with you for 24 hours.  Drink plenty of fluids.  You may eat your normal diet, unless your doctor tells you otherwise.  For 24 hours:  - Relax and take it easy.  - Do NOT smoke.  - Do NOT make any important or legal decisions.  - Do NOT drive or operate machines at home or at work.  - Do NOT drink alcohol.    Remove the Band-Aid after 24 hours. If there is minor oozing, apply another Band-aid and remove it after 12 hours.  For 2 days, do NOT have sex or do any heavy exercise.  Do NOT take a bath, or use a hot tub or pool for at least 3 days. You may shower.    Care of wrist or arm site  It is normal to have soreness at the puncture site and mild tingling in your hand for up to 3 days.  For 2 days, do not use your hand or arm to support your weight (such as rising from a chair) or bend your wrist (such as lifting a garage door).  For 2 days, do not lift more than 5 pounds or exercise your arm (tennis, golf or bowling).      If you start bleeding from the site in your arm:  Sit down and press firmly on the site with your fingers for 10 minutes. Call your doctor as soon as you can.  If the bleeding stops, sit still and keep your wrist straight for 2 hours.  Medicines  If you have begun Plavix or Effient (with a stent), do not stop taking it until you talk to your heart doctor (cardiologist).  If you are on metformin (Glucophage), do not restart it until you have blood tests (within 2 to 3 days after discharge). When your doctor tells you it is safe, you may restart the metformin.  Call your doctor if:  You have a large or growing hard lump around the site.    The site is red, swollen, hot or tender.  Blood or fluid is draining from the site.  You have chills or a fever greater than 101 F (38 C).  Your arm turns bluish, feels numb or cool.  You have hives, a rash or unusual itching.  Call 911 right away if you  have:   Bleeding that does not stop.   Heavy bleeding.  [facility] [place]:  [phone] (7 days a week)

## 2023-02-06 NOTE — Clinical Note
Prepped: groin and right radial. Prepped with: ChloraPrep. The patient was draped. .Pre-procedure site marking:N/A

## 2023-02-07 ENCOUNTER — TELEPHONE (OUTPATIENT)
Dept: CARDIOLOGY | Facility: CLINIC | Age: 58
End: 2023-02-07
Payer: COMMERCIAL

## 2023-02-07 LAB
ATRIAL RATE - MUSE: 69 BPM
DIASTOLIC BLOOD PRESSURE - MUSE: NORMAL MMHG
INTERPRETATION ECG - MUSE: NORMAL
P AXIS - MUSE: 26 DEGREES
PR INTERVAL - MUSE: 154 MS
QRS DURATION - MUSE: 86 MS
QT - MUSE: 392 MS
QTC - MUSE: 420 MS
R AXIS - MUSE: 69 DEGREES
SYSTOLIC BLOOD PRESSURE - MUSE: NORMAL MMHG
T AXIS - MUSE: 14 DEGREES
VENTRICULAR RATE- MUSE: 69 BPM

## 2023-02-07 NOTE — PROGRESS NOTES
D/I/A:  Patient is tolerating liquids and foods, ambulating, urinating, right radial puncture site no longer bleeding, covered with prima pore.  Patient has a .  A+O x4 and making needs known.  CCL access sites C/D/I; no bleeding or hematoma; CMS intact.  VSSA.  SR on monitor.  IV access removed.  Education completed and outlined in AVS or handout: medications reviewed with patient.  Questions answered prior to discharge.  Belongings returned to patient at discharge.    P: Discharged to self care.  Patient to follow up with appts as per discharge instruction.

## 2023-02-07 NOTE — TELEPHONE ENCOUNTER
Discharge follow up post PCI:      Spoke to patient and patient is doing well.  No signs of infection or bleeding from puncture site.  He is currently wearing a [employer] heart monitor and is scheduled to follow-up with Dr. Brizuela in 1 month.  What does the site look like?  Healing well  Review: Care of wrist / arm site/ femoral site  It is normal to have soreness and or bruising at the puncture site and mild tingling in your hand for up to 3 days. The site should be flat and dry.                        Wrist    For 2 days, do not use your hand or arm to support your weight (such as rising from a chair) or bend your wrist (such as lifting a garage door).                               For 2 days, do not lift more than 5 pounds or exercise your arm (tennis, golf or bowling).  Groin     No heavy lifting (10 pounds) for 5-7 days.        If you start bleeding from the site in your arm:  Sit down and press firmly on the site with your fingers for 10 minutes. Call your doctor as soon as you can.  If the bleeding stops, sit still and keep your wrist straight for 2 hours.  Do NOT take a bath, or use a hot tub or pool for at least 3 days. You may shower.     Call your doctor if:    You have a large or growing hard lump around the site.    The site is red, swollen, hot or tender.    Blood or fluid is draining from the site.    You have chills or a fever greater than 101 F (38 C).    Your leg or arm feels numb or cool.    You have hives, a rash or unusual itching.     Are you having any pain?  none     How is your activity tolerance?  normal  For 2 days, do NOT have sex or do any heavy exercise.  Do you have someone at home to assist you with your daily activities?      Review Medications: Dual antiplatelet therapy  If you have started taking Plavix do not stop taking it until you talk to your heart doctor (cardiologist). triple anticoagulation  therapy for 30 days, then discontinue the aspirin  If you have stopped any other medicines,  check with your nurse or provider about when to restart them.  Review Nitroglycerin instructions, take one tablet under the tongue for chest pain, if there is no relief take another one 5 minutes apart. If you still have no relief from the chest pain, call 911.                             FOLLOW UP  Do you have a follow up appointment with your provider? Yes  can in 1 month        CONTACT INFORMATION  Please feel free to call us with any other questions or symptoms that are concerning for you at [phone] if it is after 4:30 in the afternoon, or a weekend please call [phone] and ask for the on call specialist.  We want to do everything we can to help prevent you needing to return to the ED, so please do not hesitate to call us.

## 2023-02-07 NOTE — TELEPHONE ENCOUNTER
----- Message from Kenzie Mark RN sent at 2/7/2023  7:57 AM CST -----  Regarding: Cath results  Another cath result for a Dr. Brizuela pt from yesterday.     Access: R radial     Conclusion    Prox Cx lesion is 40% stenosed.    1st Diag lesion is 50% stenosed.    Mid LAD lesion is 40% stenosed.    Mid RCA lesion is 30% stenosed.     Non-obstructive coronary artery disease.     Plan    Follow up visit with Nurse Practitioner in 1-2 weeks.    Follow up with Dr. Brizuela.      Thanks!  Kenzie TRACEY

## 2023-02-07 NOTE — PROGRESS NOTES
After ambulating to restroom, patient's R radial wrist starting bleeding, manual pressure applied for ten minutes, hemostasis achieved. Primapore applied, VSS. Will continue to monitor.

## 2023-02-09 ENCOUNTER — ALLIED HEALTH/NURSE VISIT (OUTPATIENT)
Dept: CARDIOLOGY | Facility: CLINIC | Age: 58
End: 2023-02-09
Attending: INTERNAL MEDICINE
Payer: COMMERCIAL

## 2023-02-09 DIAGNOSIS — R94.39 ABNORMAL STRESS ECG: ICD-10-CM

## 2023-02-09 DIAGNOSIS — R55 NEAR SYNCOPE: ICD-10-CM

## 2023-02-09 NOTE — PATIENT INSTRUCTIONS
We have sent you a Zio Patch (heart) monitor for you to wear for 7 days.  You may remove the heart monitor after 7 days.  Please see the enclosed instructions for further information, as well as instructions for removal of the heart monitor and return mailing directions.  If you should have questions regarding your monitor, please call [employer] at [phone].  The Cardiology Nurse will contact you with your results (please see result notification details at bottom of summary).    *PLEASE DO NOT SHOWER OR INDUCE EXCESSIVE SWEATING IN THE FIRST 24 HOURS OF WEARING*    Please also call your insurance company for any billing questions regarding the monitor.

## 2023-02-23 DIAGNOSIS — I47.10 SVT (SUPRAVENTRICULAR TACHYCARDIA) (H): Primary | ICD-10-CM

## 2023-02-23 RX ORDER — METOPROLOL SUCCINATE 50 MG/1
50 TABLET, EXTENDED RELEASE ORAL DAILY
Qty: 90 TABLET | Refills: 1 | Status: SHIPPED | OUTPATIENT
Start: 2023-02-23 | End: 2023-03-07

## 2023-03-06 NOTE — PROGRESS NOTES
[facility]      Referring provider:Jose Willson MD    HPI: Mr. El Singh is a 58 year old  male with PMH significant for    -History of CVA  -Hypertension  -Hyperlipidemia    Patient is being seen today at request of Dr. Garcia for abnormal stress echocardiogram.    Patient is accompanied by his wife.  He tells me that over the last 1 month he feels extremely fatigued with any physical activity like climbing stairs or snowblowing.  He feels lightheaded and palpitations even climbing stairs.  He has noticed shortness of breath with exertion and mild left-sided chest discomfort.  All these are new to him.  Denies any of these symptoms at rest.  Today when he was coming from the parking lot he felt sided chest/arm discomfort  Exercise test echocardiogram 1/18/2023 was negative for inducible wall motion abnormalities however patient developed 1 to 2 mm horizontal to upsloping ST segment elevation in the inferolateral leads.  He did not report chest pain.  Resting echocardiogram showed normal biventricular function with no valve disease.  This was done at outside hospital.  Patient has no prior cardiac history.  He has a history of CVA in 2021.  He was started on losartan but then he discontinued it after a month or so.  He tells me that his blood pressure is normal at home (120-130/83 mmHg). No history of diabetes.  He quit smoking 30 years ago.  No alcohol.    Current medications:  Atorvastatin 40 mg  Aspirin 325    Medications, personal, family, and social history reviewed with patient and revised.    Interval history 3/7/2023:  Patient is being seen today for follow-up.  As you know when I saw him last time he had chest discomfort with positive stress test and palpitations recorded on his Apple Watch.  Underwent coronary angiogram which showed nonobstructive CAD.  Zio patch showed regular  SVT corresponding to his symptoms.  Since we have found out that he has SVT corresponding to palpitations I have started him on metoprolol 50 mg.      Patient tells me that since being on metoprolol he has not had palpitations.  Continues to report exertional fatigue.  No syncope or lower extremity edema.    PAST MEDICAL HISTORY:  No past medical history on file.    CURRENT     Current Outpatient Medications   Medication Sig Dispense Refill     aspirin (ASA) 325 MG EC tablet Take 325 mg by mouth every 6 hours as needed for moderate pain (4-6)       metoprolol succinate ER (TOPROL XL) 50 MG 24 hr tablet Take 1 tablet (50 mg) by mouth daily 90 tablet 1     Multiple Vitamin (MULTI-VITAMIN PO) Take 1 tablet by mouth daily         PAST SURGICAL HISTORY:  Past Surgical History:   Procedure Laterality Date     CIRCUMCISION,CLAMP      Adult     COLONOSCOPY  5/7/2014    Procedure: COLONOSCOPY;  Surgeon: Brian Trinh MD;  Location: [facility] OR     CV CORONARY ANGIOGRAM N/A 2/6/2023    Procedure: Coronary Angiogram;  Surgeon: Ward Quiles MD;  Location: [facility] CARDIAC CATH LAB     ENT SURGERY       HC OPEN TX NOSE FX+OPEN FIX SEPTUM       REMOVAL OF SPERM DUCT(S)      & Reversal       ALLERGIES:   No Known Allergies    FAMILY HISTORY:  Family History   Problem Relation Age of Onset     Heart Disease Father         dvt     Blood Disease Father         clots on coumadin     Blood Disease Other         clots on coumadin         SOCIAL HISTORY:  Social History     Tobacco Use     Smoking status: Never     Smokeless tobacco: Never   Vaping Use     Vaping Use: Never used   Substance Use Topics     Alcohol use: No     Comment: quit      Drug use: No       ROS:   Constitutional: No fever, chills, or sweats. Weight stable.   Cardiovascular: As per HPI.       Exam:  /85 (BP Location: Left arm, Patient Position: Sitting, Cuff Size: Adult Large)   Pulse 78   Wt 101.9 kg (224 lb 9.6 oz)   SpO2 97%    BMI 33.17 kg/m    GENERAL APPEARANCE: alert and no distress  HEENT: no icterus, no central cyanosis  LYMPH/NECK: no adenopathy, no asymmetry, JVP not elevated  RESPIRATORY: lungs clear to auscultation - no rales, rhonchi or wheezes, no use of accessory muscles, no retractions, respirations are unlabored, normal respiratory rate  CARDIOVASCULAR: regular rhythm, normal S1, S2, no S3 or S4 and no murmur, click or rub, precordium quiet with normal PMI.  GI: soft, non tender  EXTREMITIES: no edema  NEURO: alert, normal speech,and affect  SKIN: no ecchymoses, no rashes     I have reviewed the labs and personally reviewed the imaging below and made my comment in the assessment and plan.    Labs:  CBC RESULTS:   Lab Results   Component Value Date    WBC 5.2 02/06/2023    WBC 5.1 04/27/2021    RBC 5.27 02/06/2023    RBC 5.15 04/27/2021    HGB 15.1 02/06/2023    HGB 15.3 04/27/2021    HCT 45.3 02/06/2023    HCT 44.8 04/27/2021    MCV 86 02/06/2023    MCV 87 04/27/2021    MCH 28.7 02/06/2023    MCH 29.7 04/27/2021    MCHC 33.3 02/06/2023    MCHC 34.2 04/27/2021    RDW 12.6 02/06/2023    RDW 12.8 04/27/2021     02/06/2023     04/27/2021       BMP RESULTS:  Lab Results   Component Value Date     02/06/2023     06/24/2021    POTASSIUM 4.3 02/06/2023    POTASSIUM 4.7 06/24/2021    CHLORIDE 104 02/06/2023    CHLORIDE 109 06/24/2021    CO2 22 02/06/2023    CO2 27 06/24/2021    ANIONGAP 13 02/06/2023    ANIONGAP 5 06/24/2021    GLC 95 02/06/2023    GLC 98 06/24/2021    BUN 11.8 02/06/2023    BUN 21 06/24/2021    CR 0.77 02/06/2023    CR 0.93 06/24/2021    GFRESTIMATED >90 02/06/2023    GFRESTIMATED >90 06/24/2021    GFRESTBLACK >90 06/24/2021    PILI 9.2 02/06/2023    PILI 9.0 06/24/2021      Zio patch 2/6/2023 (8 days 11 hours) showed 228 SVT episodes longest lasting 2 minutes.          Coronary angiogram 2/6/2023  Mild nonobstructive coronary artery disease    1/18/23 Stress Echo      * Stress Echo is  negative for inducible wall motion abnormalities.   * Stress EKG is positive for ischemic changes.  There is 1- 2mm ST segment  horizontal to upsloping ST segment elevation in the inferolateral leads.   * No chest pain noted.   * 12 MET and 107 % max predicted heart rate (MPHR) achieved.   * Average aerobic capacity.   * RESTING ECHO.   * The left ventricular systolic function is normal, estimated LVEF 60-65%.      EKG 4/27/2021 shows sinus rhythm otherwise normal.          Assessment and Plan:     #Supraventricular tachycardia  -Recent Zio patch showed multiple episodes of regular SVT corresponding to his palpitations.  Discussed the Zio patch findings with the patient in clinic today. He is now on metoprolol 50 mg.  Episodes well controlled now this.  Discussed the option of ablation with the patient.  We will continue to monitor him on metoprolol for now.    #Abnormal stress test without stress induced wall motion abnormality but with ST segment changes  -Coronary angiogram showed nonobstructive CAD  -Recheck lipids (last LDL was in 2021 which was 70)  -Continue aspirin  -Patient was on atorvastatin when he had stroke for a month.  Then came off.  He is willing to retry cholesterol medication probably pravastatin since he is concerned about muscle aches.  -Continue aspirin 325 mg.    #History of CVA  -On aspirin.  -No recent cholesterol test.  -Recheck lipids.    #Obesity  -Recommend to lose weight and increase physical activity.    Return to clinic 1 year or earlier as needed.    Total time spent today for this visit is 65 minutes including precharting, face-to-face clinic visit, review of labs/imaging and medical documentation.    Please donot hesitate to contact me if you have any questions or concerns. Again, thank you for allowing me to participate in the care of your patient.    Kerline GRIMM MD  [facility] Division of Cardiology  Pager [phone]

## 2023-03-07 ENCOUNTER — OFFICE VISIT (OUTPATIENT)
Dept: CARDIOLOGY | Facility: CLINIC | Age: 58
End: 2023-03-07
Payer: COMMERCIAL

## 2023-03-07 VITALS
WEIGHT: 224.6 LBS | HEART RATE: 65 BPM | DIASTOLIC BLOOD PRESSURE: 72 MMHG | OXYGEN SATURATION: 97 % | BODY MASS INDEX: 33.17 KG/M2 | SYSTOLIC BLOOD PRESSURE: 129 MMHG

## 2023-03-07 DIAGNOSIS — I47.10 SUPRAVENTRICULAR TACHYCARDIA (H): Primary | ICD-10-CM

## 2023-03-07 DIAGNOSIS — Z86.73 HISTORY OF CVA (CEREBROVASCULAR ACCIDENT): ICD-10-CM

## 2023-03-07 DIAGNOSIS — R94.39 ABNORMAL STRESS ECG: ICD-10-CM

## 2023-03-07 DIAGNOSIS — I47.10 SVT (SUPRAVENTRICULAR TACHYCARDIA) (H): ICD-10-CM

## 2023-03-07 DIAGNOSIS — E66.812 CLASS 2 OBESITY DUE TO EXCESS CALORIES WITHOUT SERIOUS COMORBIDITY WITH BODY MASS INDEX (BMI) OF 35.0 TO 35.9 IN ADULT: ICD-10-CM

## 2023-03-07 DIAGNOSIS — E66.09 CLASS 2 OBESITY DUE TO EXCESS CALORIES WITHOUT SERIOUS COMORBIDITY WITH BODY MASS INDEX (BMI) OF 35.0 TO 35.9 IN ADULT: ICD-10-CM

## 2023-03-07 PROCEDURE — 99215 OFFICE O/P EST HI 40 MIN: CPT | Performed by: INTERNAL MEDICINE

## 2023-03-07 RX ORDER — METOPROLOL SUCCINATE 50 MG/1
50 TABLET, EXTENDED RELEASE ORAL DAILY
Qty: 90 TABLET | Refills: 3 | Status: SHIPPED | OUTPATIENT
Start: 2023-03-07 | End: 2023-05-16

## 2023-03-07 NOTE — NURSING NOTE
"Chief Complaint   Patient presents with     Follow Up     Pt reports \"blackout sensation\", tired quicker after light shoveling, sleeping earlier at night, 1 month follow up; Dx/Sx: S/P Zio and CTA        Initial /85 (BP Location: Left arm, Patient Position: Sitting, Cuff Size: Adult Large)   Pulse 78   Wt 101.9 kg (224 lb 9.6 oz)   SpO2 97%   BMI 33.17 kg/m   Estimated body mass index is 33.17 kg/m  as calculated from the following:    Height as of 2/6/23: 1.753 m (5' 9\").    Weight as of this encounter: 101.9 kg (224 lb 9.6 oz)..  BP completed using cuff size: KARLENE Villavicencio    "

## 2023-03-07 NOTE — LETTER
3/7/2023      RE: El Singh  [address] [id number]       Dear Colleague,    Thank you for the opportunity to participate in the care of your patient, El Singh, at the [facility]ART [facility]IN[facility]. Please see a copy of my visit note below.                                                                                               [facility]      Referring provider:Jose Willson MD    HPI: Mr. El Singh is a 58 year old  male with PMH significant for    -History of CVA  -Hypertension  -Hyperlipidemia    Patient is being seen today at request of Dr. Garcia for abnormal stress echocardiogram.    Patient is accompanied by his wife.  He tells me that over the last 1 month he feels extremely fatigued with any physical activity like climbing stairs or snowblowing.  He feels lightheaded and palpitations even climbing stairs.  He has noticed shortness of breath with exertion and mild left-sided chest discomfort.  All these are new to him.  Denies any of these symptoms at rest.  Today when he was coming from the parking lot he felt sided chest/arm discomfort  Exercise test echocardiogram 1/18/2023 was negative for inducible wall motion abnormalities however patient developed 1 to 2 mm horizontal to upsloping ST segment elevation in the inferolateral leads.  He did not report chest pain.  Resting echocardiogram showed normal biventricular function with no valve disease.  This was done at outside hospital.  Patient has no prior cardiac history.  He has a history of CVA in 2021.  He was started on losartan but then he discontinued it after a month or so.  He tells me that his blood pressure is normal at home (120-130/83 mmHg). No history of diabetes.  He quit smoking 30 years ago.  No alcohol.    Current medications:  Atorvastatin 40 mg  Aspirin 325    Medications, personal, family, and social  history reviewed with patient and revised.    Interval history 3/7/2023:  Patient is being seen today for follow-up.  As you know when I saw him last time he had chest discomfort with positive stress test and palpitations recorded on his Apple Watch.  Underwent coronary angiogram which showed nonobstructive CAD.  Zio patch showed regular SVT corresponding to his symptoms.  Since we have found out that he has SVT corresponding to palpitations I have started him on metoprolol 50 mg.      Patient tells me that since being on metoprolol he has not had palpitations.  Continues to report exertional fatigue.  No syncope or lower extremity edema.    PAST MEDICAL HISTORY:  No past medical history on file.    CURRENT     Current Outpatient Medications   Medication Sig Dispense Refill     aspirin (ASA) 325 MG EC tablet Take 325 mg by mouth every 6 hours as needed for moderate pain (4-6)       metoprolol succinate ER (TOPROL XL) 50 MG 24 hr tablet Take 1 tablet (50 mg) by mouth daily 90 tablet 1     Multiple Vitamin (MULTI-VITAMIN PO) Take 1 tablet by mouth daily         PAST SURGICAL HISTORY:  Past Surgical History:   Procedure Laterality Date     CIRCUMCISION,CLAMP      Adult     COLONOSCOPY  5/7/2014    Procedure: COLONOSCOPY;  Surgeon: Brian Trinh MD;  Location: [facility] OR     CV CORONARY ANGIOGRAM N/A 2/6/2023    Procedure: Coronary Angiogram;  Surgeon: Ward Quiles MD;  Location:  HEART CARDIAC CATH LAB     ENT SURGERY       HC OPEN TX NOSE FX+OPEN FIX SEPTUM       REMOVAL OF SPERM DUCT(S)      & Reversal       ALLERGIES:   No Known Allergies    FAMILY HISTORY:  Family History   Problem Relation Age of Onset     Heart Disease Father         dvt     Blood Disease Father         clots on coumadin     Blood Disease Other         clots on coumadin         SOCIAL HISTORY:  Social History     Tobacco Use     Smoking status: Never     Smokeless tobacco: Never   Vaping Use     Vaping Use: Never used    Substance Use Topics     Alcohol use: No     Comment: quit      Drug use: No       ROS:   Constitutional: No fever, chills, or sweats. Weight stable.   Cardiovascular: As per HPI.       Exam:  /85 (BP Location: Left arm, Patient Position: Sitting, Cuff Size: Adult Large)   Pulse 78   Wt 101.9 kg (224 lb 9.6 oz)   SpO2 97%   BMI 33.17 kg/m    GENERAL APPEARANCE: alert and no distress  HEENT: no icterus, no central cyanosis  LYMPH/NECK: no adenopathy, no asymmetry, JVP not elevated  RESPIRATORY: lungs clear to auscultation - no rales, rhonchi or wheezes, no use of accessory muscles, no retractions, respirations are unlabored, normal respiratory rate  CARDIOVASCULAR: regular rhythm, normal S1, S2, no S3 or S4 and no murmur, click or rub, precordium quiet with normal PMI.  GI: soft, non tender  EXTREMITIES: no edema  NEURO: alert, normal speech,and affect  SKIN: no ecchymoses, no rashes     I have reviewed the labs and personally reviewed the imaging below and made my comment in the assessment and plan.    Labs:  CBC RESULTS:   Lab Results   Component Value Date    WBC 5.2 02/06/2023    WBC 5.1 04/27/2021    RBC 5.27 02/06/2023    RBC 5.15 04/27/2021    HGB 15.1 02/06/2023    HGB 15.3 04/27/2021    HCT 45.3 02/06/2023    HCT 44.8 04/27/2021    MCV 86 02/06/2023    MCV 87 04/27/2021    MCH 28.7 02/06/2023    MCH 29.7 04/27/2021    MCHC 33.3 02/06/2023    MCHC 34.2 04/27/2021    RDW 12.6 02/06/2023    RDW 12.8 04/27/2021     02/06/2023     04/27/2021       BMP RESULTS:  Lab Results   Component Value Date     02/06/2023     06/24/2021    POTASSIUM 4.3 02/06/2023    POTASSIUM 4.7 06/24/2021    CHLORIDE 104 02/06/2023    CHLORIDE 109 06/24/2021    CO2 22 02/06/2023    CO2 27 06/24/2021    ANIONGAP 13 02/06/2023    ANIONGAP 5 06/24/2021    GLC 95 02/06/2023    GLC 98 06/24/2021    BUN 11.8 02/06/2023    BUN 21 06/24/2021    CR 0.77 02/06/2023    CR 0.93 06/24/2021    GFRESTIMATED  >90 02/06/2023    GFRESTIMATED >90 06/24/2021    GFRESTBLACK >90 06/24/2021    PILI 9.2 02/06/2023    PILI 9.0 06/24/2021      Zio patch 2/6/2023 (8 days 11 hours) showed 228 SVT episodes longest lasting 2 minutes.          Coronary angiogram 2/6/2023  Mild nonobstructive coronary artery disease    1/18/23 Stress Echo      * Stress Echo is negative for inducible wall motion abnormalities.   * Stress EKG is positive for ischemic changes.  There is 1- 2mm ST segment  horizontal to upsloping ST segment elevation in the inferolateral leads.   * No chest pain noted.   * 12 MET and 107 % max predicted heart rate (MPHR) achieved.   * Average aerobic capacity.   * RESTING ECHO.   * The left ventricular systolic function is normal, estimated LVEF 60-65%.      EKG 4/27/2021 shows sinus rhythm otherwise normal.          Assessment and Plan:     #Supraventricular tachycardia  -Recent Zio patch showed multiple episodes of regular SVT corresponding to his palpitations.  Discussed the Zio patch findings with the patient in clinic today. He is now on metoprolol 50 mg.  Episodes well controlled now this.  Discussed the option of ablation with the patient.  We will continue to monitor him on metoprolol for now.    #Abnormal stress test without stress induced wall motion abnormality but with ST segment changes  -Coronary angiogram showed nonobstructive CAD  -Recheck lipids (last LDL was in 2021 which was 70)  -Continue aspirin  -Patient was on atorvastatin when he had stroke for a month.  Then came off.  He is willing to retry cholesterol medication probably pravastatin since he is concerned about muscle aches.  -Continue aspirin 325 mg.    #History of CVA  -On aspirin.  -No recent cholesterol test.  -Recheck lipids.    #Obesity  -Recommend to lose weight and increase physical activity.    Return to clinic 1 year or earlier as needed.    Total time spent today for this visit is 65 minutes including precharting, face-to-face clinic  visit, review of labs/imaging and medical documentation.    Please donot hesitate to contact me if you have any questions or concerns. Again, thank you for allowing me to participate in the care of your patient.    Kerline GRIMM MD  [facility] Division of Cardiology  Pager [phone]

## 2023-03-07 NOTE — PATIENT INSTRUCTIONS
Thank you for coming to the [facility] at [place]; please note the following instructions:    1. We will schedule a cholesterol check    2. Dr. Brizuela recommends that you lose weight    3. Continue the metoprolol    4. 1 year follow-up in cardiology. A member of the cardiology team will contact you when it is closer to time to schedule.        If you have any questions regarding your visit, please contact your care team:     CARDIOLOGY  TELEPHONE NUMBER   Vani VALENCIAGarcia, Registered Nurse  Letty SANTIAGO, Registered Nurse  Linsey BANKS, Registered Medical Assistant  Holly OSULLIVAN, Certified Medical Assistant  Laurel SCHWARTZ, Visit Facilitator [phone] (select option 1)    *After hours: [phone]   For Scheduling Appts:     [phone] (select option 1)    *After hours: [phone]   For the Device Clinic (Pacemakers and ICD's)  Ashley HE, Registered Nurse   During business hours: [phone]    *After business hours:  [phone] (select option 4)      Normal test result notifications will be released via [employer] or mailed within 7 business days.  All other test results, will be communicated via telephone once reviewed by your cardiologist.    If you need a medication refill, please contact your pharmacy.  Please allow 3 business days for your refill to be completed.    As always, thank you for trusting us with your health care needs!

## 2023-04-20 ENCOUNTER — TRANSFERRED RECORDS (OUTPATIENT)
Dept: HEALTH INFORMATION MANAGEMENT | Facility: CLINIC | Age: 58
End: 2023-04-20
Payer: COMMERCIAL

## 2023-04-29 ENCOUNTER — TRANSFERRED RECORDS (OUTPATIENT)
Dept: FAMILY MEDICINE | Facility: CLINIC | Age: 58
End: 2023-04-29
Payer: COMMERCIAL

## 2023-05-16 ENCOUNTER — ANCILLARY PROCEDURE (OUTPATIENT)
Dept: CARDIOLOGY | Facility: CLINIC | Age: 58
End: 2023-05-16
Payer: COMMERCIAL

## 2023-05-16 ENCOUNTER — OFFICE VISIT (OUTPATIENT)
Dept: CARDIOLOGY | Facility: CLINIC | Age: 58
End: 2023-05-16
Payer: COMMERCIAL

## 2023-05-16 VITALS
OXYGEN SATURATION: 98 % | BODY MASS INDEX: 33.37 KG/M2 | WEIGHT: 226 LBS | SYSTOLIC BLOOD PRESSURE: 122 MMHG | DIASTOLIC BLOOD PRESSURE: 81 MMHG | HEART RATE: 61 BPM

## 2023-05-16 DIAGNOSIS — I47.10 SVT (SUPRAVENTRICULAR TACHYCARDIA) (H): ICD-10-CM

## 2023-05-16 DIAGNOSIS — I47.10 SVT (SUPRAVENTRICULAR TACHYCARDIA) (H): Primary | ICD-10-CM

## 2023-05-16 DIAGNOSIS — I25.10 MILD CORONARY ARTERY DISEASE: ICD-10-CM

## 2023-05-16 PROCEDURE — 93248 EXT ECG>7D<15D REV&INTERPJ: CPT | Performed by: INTERNAL MEDICINE

## 2023-05-16 PROCEDURE — 99215 OFFICE O/P EST HI 40 MIN: CPT | Performed by: INTERNAL MEDICINE

## 2023-05-16 PROCEDURE — 93246 EXT ECG>7D<15D RECORDING: CPT | Performed by: INTERNAL MEDICINE

## 2023-05-16 RX ORDER — METOPROLOL SUCCINATE 100 MG/1
100 TABLET, EXTENDED RELEASE ORAL DAILY
Qty: 90 TABLET | Refills: 3 | Status: SHIPPED | OUTPATIENT
Start: 2023-05-16 | End: 2023-07-21

## 2023-05-16 NOTE — NURSING NOTE
"Chief Complaint   Patient presents with     RECHECK     Return general cardiology for heart racing, chest discomfort, lightheadedness, fatigue, AMIN, SOB.        Initial /81 (BP Location: Left arm, Patient Position: Chair, Cuff Size: Adult Large)   Pulse 61   Wt 102.5 kg (226 lb)   SpO2 98%   BMI 33.37 kg/m   Estimated body mass index is 33.37 kg/m  as calculated from the following:    Height as of 2/6/23: 1.753 m (5' 9\").    Weight as of this encounter: 102.5 kg (226 lb)..  BP completed using cuff size: AUTUMN Holman  "

## 2023-05-16 NOTE — PATIENT INSTRUCTIONS
Thank you for coming to the [facility] @ [place]; please note the following instructions:    1. Increase your metoprolol from 50 mg to 100 mg. A new prescription has been sent to your pharmacy.     2.  We have applied a Zio Patch (heart) monitor for you to wear for 14 days.  You may remove the heart monitor on 5/30/23 at 4pm.  Please see the enclosed instructions for further information, as well as instructions for removal of the heart monitor and return mailing directions.  If you should have questions regarding your monitor, please call [employer]. at [phone].  The Cardiology Nurse will contact you with your results (please see result notification details at bottom of summary).    *PLEASE DO NOT SHOWER OR INDUCE EXCESSIVE SWEATING IN THE FIRST 24 HOURS OF WEARING*    Please also call your insurance company for any billing questions regarding the monitor.    3. Send a [employer] message with how your palpitations are doing with the medication change. You can do this after you take the Zio off.     4. Cardiology Providers: Dr. Churchill Can would like you to return for a cardiac follow up in 1 year  (May).  We will contact you regarding your appointment when the time draws closer or you may call [phone] option #1 to arrange an appointment.  Mean while, if you should have any questions or concerns regarding your heart health, please contact us.  Thank you for choosing [facility] for your care.            If you have any questions regarding your visit please contact your care team:     Cardiology  Telephone Number   Vani THIBODEAUX, RN  Letty SANTIAGO, RN   Josefina SYKES, AUTUMN BANKS, AUTUMN SCHWARTZ, Visit Facilitator  Holly OSULLIVAN [facility] [phone] (option 1)   For scheduling appts:     [phone] (select option 1)       For the Device Clinic (Pacemakers and ICD's)  RN's :  Ashley Bae   During business hours: [phone]    *After business hours:  [phone] (select  option 4)      Normal test result notifications will be released via [employer] or mailed within 7 business days.  All other test results, will be communicated via telephone once reviewed by your cardiologist.    If you need a medication refill please contact your pharmacy.  Please allow 3 business days for your refill to be completed.    As always, thank you for trusting us with your health care needs!

## 2023-05-16 NOTE — PROGRESS NOTES
[facility]      Referring provider:Jose Willson MD    HPI: Mr. El Singh is a 58 year old  male with PMH significant for    -History of CVA  -Hypertension  -Hyperlipidemia    Patient is being seen today at request of Dr. Garcia for abnormal stress echocardiogram.    Patient is accompanied by his wife.  He tells me that over the last 1 month he feels extremely fatigued with any physical activity like climbing stairs or snowblowing.  He feels lightheaded and palpitations even climbing stairs.  He has noticed shortness of breath with exertion and mild left-sided chest discomfort.  All these are new to him.  Denies any of these symptoms at rest.  Today when he was coming from the parking lot he felt sided chest/arm discomfort  Exercise test echocardiogram 1/18/2023 was negative for inducible wall motion abnormalities however patient developed 1 to 2 mm horizontal to upsloping ST segment elevation in the inferolateral leads.  He did not report chest pain.  Resting echocardiogram showed normal biventricular function with no valve disease.  This was done at outside hospital.  Patient has no prior cardiac history.  He has a history of CVA in 2021.  He was started on losartan but then he discontinued it after a month or so.  He tells me that his blood pressure is normal at home (120-130/83 mmHg). No history of diabetes.  He quit smoking 30 years ago.  No alcohol.    Current medications:  Atorvastatin 40 mg  Aspirin 325    Medications, personal, family, and social history reviewed with patient and revised.    Interval history 3/7/2023:  Patient is being seen today for follow-up.  As you know when I saw him last time he had chest discomfort with positive stress test and palpitations recorded on his Apple Watch.  Underwent coronary angiogram which showed nonobstructive CAD.  Zio patch showed regular  SVT corresponding to his symptoms.  Since we have found out that he has SVT corresponding to palpitations I have started him on metoprolol 50 mg.      Patient tells me that since being on metoprolol he has not had palpitations.  Continues to report exertional fatigue.  No syncope or lower extremity edema.    Interval history 5/16/2023:  Patient is being seen today for palpitations over the last 4 or 5 days which has been happening 4-5 times a day.  Lasting for few minutes.  Making him feel tired and dizzy.  Did not pass out.  No chest pain.      PAST MEDICAL HISTORY:  No past medical history on file.    CURRENT     Current Outpatient Medications   Medication Sig Dispense Refill     aspirin (ASA) 325 MG EC tablet Take 325 mg by mouth every 6 hours as needed for moderate pain (4-6)       Cholecalciferol (VITAMIN D-3 PO)        metoprolol succinate ER (TOPROL XL) 50 MG 24 hr tablet Take 1 tablet (50 mg) by mouth daily 90 tablet 3     Multiple Vitamin (MULTI-VITAMIN PO) Take 1 tablet by mouth daily       TURMERIC PO          PAST SURGICAL HISTORY:  Past Surgical History:   Procedure Laterality Date     CIRCUMCISION,CLAMP      Adult     COLONOSCOPY  5/7/2014    Procedure: COLONOSCOPY;  Surgeon: Brian Trinh MD;  Location:  OR     CV CORONARY ANGIOGRAM N/A 2/6/2023    Procedure: Coronary Angiogram;  Surgeon: Ward Quiles MD;  Location:  HEART CARDIAC CATH LAB     ENT SURGERY       HC OPEN TX NOSE FX+OPEN FIX SEPTUM       REMOVAL OF SPERM DUCT(S)      & Reversal       ALLERGIES:   No Known Allergies    FAMILY HISTORY:  Family History   Problem Relation Age of Onset     Heart Disease Father         dvt     Blood Disease Father         clots on coumadin     Blood Disease Other         clots on coumadin         SOCIAL HISTORY:  Social History     Tobacco Use     Smoking status: Never     Smokeless tobacco: Never   Vaping Use     Vaping status: Never Used   Substance Use Topics     Alcohol  use: No     Comment: quit      Drug use: No       ROS:   Constitutional: No fever, chills, or sweats. Weight stable.   Cardiovascular: As per HPI.       Exam:  /81 (BP Location: Left arm, Patient Position: Chair, Cuff Size: Adult Large)   Pulse 61   Wt 102.5 kg (226 lb)   SpO2 98%   BMI 33.37 kg/m    GENERAL APPEARANCE: alert and no distress  HEENT: no icterus, no central cyanosis  LYMPH/NECK: no adenopathy, no asymmetry, JVP not elevated  CARDIOVASCULAR: regular rhythm, normal S1, S2, no S3 or S4 and no murmur, click or rub, precordium quiet with normal PMI.  EXTREMITIES: no edema  NEURO: alert, normal speech,and affect  SKIN: no ecchymoses, no rashes     I have reviewed the labs and personally reviewed the imaging below and made my comment in the assessment and plan.    Labs:  CBC RESULTS:   Lab Results   Component Value Date    WBC 5.2 02/06/2023    WBC 5.1 04/27/2021    RBC 5.27 02/06/2023    RBC 5.15 04/27/2021    HGB 15.1 02/06/2023    HGB 15.3 04/27/2021    HCT 45.3 02/06/2023    HCT 44.8 04/27/2021    MCV 86 02/06/2023    MCV 87 04/27/2021    MCH 28.7 02/06/2023    MCH 29.7 04/27/2021    MCHC 33.3 02/06/2023    MCHC 34.2 04/27/2021    RDW 12.6 02/06/2023    RDW 12.8 04/27/2021     02/06/2023     04/27/2021       BMP RESULTS:  Lab Results   Component Value Date     02/06/2023     06/24/2021    POTASSIUM 4.3 02/06/2023    POTASSIUM 4.7 06/24/2021    CHLORIDE 104 02/06/2023    CHLORIDE 109 06/24/2021    CO2 22 02/06/2023    CO2 27 06/24/2021    ANIONGAP 13 02/06/2023    ANIONGAP 5 06/24/2021    GLC 95 02/06/2023    GLC 98 06/24/2021    BUN 11.8 02/06/2023    BUN 21 06/24/2021    CR 0.77 02/06/2023    CR 0.93 06/24/2021    GFRESTIMATED >90 02/06/2023    GFRESTIMATED >90 06/24/2021    GFRESTBLACK >90 06/24/2021    PILI 9.2 02/06/2023    PILI 9.0 06/24/2021      Zio patch 2/6/2023 (8 days 11 hours) showed 228 SVT episodes longest lasting 2 minutes.          Coronary  angiogram 2/6/2023  Mild nonobstructive coronary artery disease    1/18/23 Stress Echo      * Stress Echo is negative for inducible wall motion abnormalities.   * Stress EKG is positive for ischemic changes.  There is 1- 2mm ST segment  horizontal to upsloping ST segment elevation in the inferolateral leads.   * No chest pain noted.   * 12 MET and 107 % max predicted heart rate (MPHR) achieved.   * Average aerobic capacity.   * RESTING ECHO.   * The left ventricular systolic function is normal, estimated LVEF 60-65%.      EKG 4/27/2021 shows sinus rhythm otherwise normal.          Assessment and Plan:     #Supraventricular tachycardia  -Recent Zio patch in February of this year showed multiple episodes of regular SVT longest lasting 2 minutes.  I started him on metoprolol 50 mg at that time.  He was doing well until recently.  Over the last few days he has started feeling more palpitation episodes multiple times a day lasting for few minutes making him tired and dizzy.  Today I have discussed with the patient that we should increase the dose of metoprolol to 100 mg daily, recheck Zio patch to make sure that this is SVT rather than any other rhythm issue.  The patient is agreeable with the plan.    -Increase metoprolol dose from 50 to 100 mg daily  -Zio patch for 2 weeks  -Patient will send me a [employer] message once I share results of Zio patch to update me about his symptoms    #Abnormal stress test without stress induced wall motion abnormality but with ST segment changes  -Coronary angiogram 2/6/2023 showed nonobstructive CAD  -Continue aspirin.  -Patient was on atorvastatin when he had stroke for a month.  Then came off.  He is willing to retry cholesterol medication probably pravastatin since he is concerned about muscle aches.  -Continue aspirin 325 mg.  -Scheduled for cholesterol test later this month.    #History of CVA  -On aspirin.    #Obesity  -Recommend to lose weight and increase physical  activity.    Return to clinic 1 year or earlier as needed.    Total time spent today for this visit is 40 minutes including precharting, face-to-face clinic visit, review of labs/imaging and medical documentation.    Please donot hesitate to contact me if you have any questions or concerns. Again, thank you for allowing me to participate in the care of your patient.    Kerline GRIMM MD  [facility] Division of Cardiology  Pager [phone]

## 2023-05-16 NOTE — LETTER
5/16/2023      RE: El Singh  [address] [address]       Dear Colleague,    Thank you for the opportunity to participate in the care of your patient, El Singh, at the [facility]. Please see a copy of my visit note below.                                                                                               General Cardiology Clinic-[place]      Referring provider:Jose Willson MD    HPI: Mr. El Singh is a 58 year old  male with PMH significant for    -History of CVA  -Hypertension  -Hyperlipidemia    Patient is being seen today at request of Dr. Garcia for abnormal stress echocardiogram.    Patient is accompanied by his wife.  He tells me that over the last 1 month he feels extremely fatigued with any physical activity like climbing stairs or snowblowing.  He feels lightheaded and palpitations even climbing stairs.  He has noticed shortness of breath with exertion and mild left-sided chest discomfort.  All these are new to him.  Denies any of these symptoms at rest.  Today when he was coming from the parking lot he felt sided chest/arm discomfort  Exercise test echocardiogram 1/18/2023 was negative for inducible wall motion abnormalities however patient developed 1 to 2 mm horizontal to upsloping ST segment elevation in the inferolateral leads.  He did not report chest pain.  Resting echocardiogram showed normal biventricular function with no valve disease.  This was done at outside hospital.  Patient has no prior cardiac history.  He has a history of CVA in 2021.  He was started on losartan but then he discontinued it after a month or so.  He tells me that his blood pressure is normal at home (120-130/83 mmHg). No history of diabetes.  He quit smoking 30 years ago.  No alcohol.    Current medications:  Atorvastatin 40 mg  Aspirin 325    Medications, personal, family, and social  history reviewed with patient and revised.    Interval history 3/7/2023:  Patient is being seen today for follow-up.  As you know when I saw him last time he had chest discomfort with positive stress test and palpitations recorded on his Apple Watch.  Underwent coronary angiogram which showed nonobstructive CAD.  Zio patch showed regular SVT corresponding to his symptoms.  Since we have found out that he has SVT corresponding to palpitations I have started him on metoprolol 50 mg.      Patient tells me that since being on metoprolol he has not had palpitations.  Continues to report exertional fatigue.  No syncope or lower extremity edema.    Interval history 5/16/2023:  Patient is being seen today for palpitations over the last 4 or 5 days which has been happening 4-5 times a day.  Lasting for few minutes.  Making him feel tired and dizzy.  Did not pass out.  No chest pain.      PAST MEDICAL HISTORY:  No past medical history on file.    CURRENT     Current Outpatient Medications   Medication Sig Dispense Refill    aspirin (ASA) 325 MG EC tablet Take 325 mg by mouth every 6 hours as needed for moderate pain (4-6)      Cholecalciferol (VITAMIN D-3 PO)       metoprolol succinate ER (TOPROL XL) 50 MG 24 hr tablet Take 1 tablet (50 mg) by mouth daily 90 tablet 3    Multiple Vitamin (MULTI-VITAMIN PO) Take 1 tablet by mouth daily      TURMERIC PO          PAST SURGICAL HISTORY:  Past Surgical History:   Procedure Laterality Date    CIRCUMCISION,CLAMP      Adult    COLONOSCOPY  5/7/2014    Procedure: COLONOSCOPY;  Surgeon: Brian Trinh MD;  Location:  OR    CV CORONARY ANGIOGRAM N/A 2/6/2023    Procedure: Coronary Angiogram;  Surgeon: Ward Quiles MD;  Location:  HEART CARDIAC CATH LAB    ENT SURGERY      HC OPEN TX NOSE FX+OPEN FIX SEPTUM      REMOVAL OF SPERM DUCT(S)      & Reversal       ALLERGIES:   No Known Allergies    FAMILY HISTORY:  Family History   Problem Relation Age of Onset     Heart Disease Father         dvt    Blood Disease Father         clots on coumadin    Blood Disease Other         clots on coumadin         SOCIAL HISTORY:  Social History     Tobacco Use    Smoking status: Never    Smokeless tobacco: Never   Vaping Use    Vaping status: Never Used   Substance Use Topics    Alcohol use: No     Comment: quit     Drug use: No       ROS:   Constitutional: No fever, chills, or sweats. Weight stable.   Cardiovascular: As per HPI.       Exam:  /81 (BP Location: Left arm, Patient Position: Chair, Cuff Size: Adult Large)   Pulse 61   Wt 102.5 kg (226 lb)   SpO2 98%   BMI 33.37 kg/m    GENERAL APPEARANCE: alert and no distress  HEENT: no icterus, no central cyanosis  LYMPH/NECK: no adenopathy, no asymmetry, JVP not elevated  CARDIOVASCULAR: regular rhythm, normal S1, S2, no S3 or S4 and no murmur, click or rub, precordium quiet with normal PMI.  EXTREMITIES: no edema  NEURO: alert, normal speech,and affect  SKIN: no ecchymoses, no rashes     I have reviewed the labs and personally reviewed the imaging below and made my comment in the assessment and plan.    Labs:  CBC RESULTS:   Lab Results   Component Value Date    WBC 5.2 02/06/2023    WBC 5.1 04/27/2021    RBC 5.27 02/06/2023    RBC 5.15 04/27/2021    HGB 15.1 02/06/2023    HGB 15.3 04/27/2021    HCT 45.3 02/06/2023    HCT 44.8 04/27/2021    MCV 86 02/06/2023    MCV 87 04/27/2021    MCH 28.7 02/06/2023    MCH 29.7 04/27/2021    MCHC 33.3 02/06/2023    MCHC 34.2 04/27/2021    RDW 12.6 02/06/2023    RDW 12.8 04/27/2021     02/06/2023     04/27/2021       BMP RESULTS:  Lab Results   Component Value Date     02/06/2023     06/24/2021    POTASSIUM 4.3 02/06/2023    POTASSIUM 4.7 06/24/2021    CHLORIDE 104 02/06/2023    CHLORIDE 109 06/24/2021    CO2 22 02/06/2023    CO2 27 06/24/2021    ANIONGAP 13 02/06/2023    ANIONGAP 5 06/24/2021    GLC 95 02/06/2023    GLC 98 06/24/2021    BUN 11.8 02/06/2023     BUN 21 06/24/2021    CR 0.77 02/06/2023    CR 0.93 06/24/2021    GFRESTIMATED >90 02/06/2023    GFRESTIMATED >90 06/24/2021    GFRESTBLACK >90 06/24/2021    PILI 9.2 02/06/2023    PILI 9.0 06/24/2021      Zio patch 2/6/2023 (8 days 11 hours) showed 228 SVT episodes longest lasting 2 minutes.          Coronary angiogram 2/6/2023  Mild nonobstructive coronary artery disease    1/18/23 Stress Echo      * Stress Echo is negative for inducible wall motion abnormalities.   * Stress EKG is positive for ischemic changes.  There is 1- 2mm ST segment  horizontal to upsloping ST segment elevation in the inferolateral leads.   * No chest pain noted.   * 12 MET and 107 % max predicted heart rate (MPHR) achieved.   * Average aerobic capacity.   * RESTING ECHO.   * The left ventricular systolic function is normal, estimated LVEF 60-65%.      EKG 4/27/2021 shows sinus rhythm otherwise normal.          Assessment and Plan:     #Supraventricular tachycardia  -Recent Zio patch in February of this year showed multiple episodes of regular SVT longest lasting 2 minutes.  I started him on metoprolol 50 mg at that time.  He was doing well until recently.  Over the last few days he has started feeling more palpitation episodes multiple times a day lasting for few minutes making him tired and dizzy.  Today I have discussed with the patient that we should increase the dose of metoprolol to 100 mg daily, recheck Zio patch to make sure that this is SVT rather than any other rhythm issue.  The patient is agreeable with the plan.    -Increase metoprolol dose from 50 to 100 mg daily  -Zio patch for 2 weeks  -Patient will send me a [employer] message once I share results of Zio patch to update me about his symptoms    #Abnormal stress test without stress induced wall motion abnormality but with ST segment changes  -Coronary angiogram 2/6/2023 showed nonobstructive CAD  -Continue aspirin.  -Patient was on atorvastatin when he had stroke for a month.   Then came off.  He is willing to retry cholesterol medication probably pravastatin since he is concerned about muscle aches.  -Continue aspirin 325 mg.  -Scheduled for cholesterol test later this month.    #History of CVA  -On aspirin.    #Obesity  -Recommend to lose weight and increase physical activity.    Return to clinic 1 year or earlier as needed.    Total time spent today for this visit is 40 minutes including precharting, face-to-face clinic visit, review of labs/imaging and medical documentation.    Please donot hesitate to contact me if you have any questions or concerns. Again, thank you for allowing me to participate in the care of your patient.    Kerline GRIMM MD  [facility] Division of Cardiology  Pager [phone]

## 2023-05-30 ENCOUNTER — LAB (OUTPATIENT)
Dept: LAB | Facility: CLINIC | Age: 58
End: 2023-05-30
Payer: COMMERCIAL

## 2023-05-30 DIAGNOSIS — R94.39 ABNORMAL STRESS ECG: ICD-10-CM

## 2023-05-30 LAB
CHOLEST SERPL-MCNC: 214 MG/DL
FASTING STATUS PATIENT QL REPORTED: YES
HDLC SERPL-MCNC: 56 MG/DL
LDLC SERPL CALC-MCNC: 138 MG/DL
NONHDLC SERPL-MCNC: 158 MG/DL
TRIGL SERPL-MCNC: 101 MG/DL

## 2023-05-30 PROCEDURE — 80061 LIPID PANEL: CPT

## 2023-05-30 PROCEDURE — 36415 COLL VENOUS BLD VENIPUNCTURE: CPT

## 2023-06-30 NOTE — TELEPHONE ENCOUNTER
RECORDS RECEIVED FROM:    DATE RECEIVED:    NOTES STATUS DETAILS   OFFICE NOTE from referring provider  Internal Dr. Brizuela 5-16-23 FK   OFFICE NOTE from other cardiologists  Internal Dr. Brizuela 5-16-23 FK   RECORDS from hospital/ED Care Everywhere 1-1-23 NM   MEDICATION LIST Internal    GENERAL CARDIO RECORDS   (ALL APPOINTMENT TYPES)     LABS (CBC,BMP,CMP, TSH) Care Everywhere    EKG (STRIPS & REPORTS) Internal 2-6-23   MONITORS (STRIPS & REPORTS) Internal 5-16-23   ECHOS (IMAGES AND REPORTS) In process 1-18-23 Requested   STRESS TESTS (IMAGES AND REPORTS) In process 1-18-23 Requested   cMRI (IMAGES AND REPORTS) N/A    CT/CTA (IMAGES AND REPORTS) N/A      Action 6/30/23 NM  Fax #[phone]   Action Taken Requested echo 1-18-23    Sent second request 7/7    Tracking #[id number]    Received disk and submitted to  for import.   7/12

## 2023-07-17 PROBLEM — E78.5 HYPERLIPIDEMIA: Status: ACTIVE | Noted: 2021-04-05

## 2023-07-21 ENCOUNTER — PRE VISIT (OUTPATIENT)
Dept: CARDIOLOGY | Facility: CLINIC | Age: 58
End: 2023-07-21
Payer: COMMERCIAL

## 2023-07-21 ENCOUNTER — OFFICE VISIT (OUTPATIENT)
Dept: CARDIOLOGY | Facility: CLINIC | Age: 58
End: 2023-07-21
Attending: INTERNAL MEDICINE
Payer: COMMERCIAL

## 2023-07-21 VITALS
DIASTOLIC BLOOD PRESSURE: 87 MMHG | OXYGEN SATURATION: 94 % | BODY MASS INDEX: 32.75 KG/M2 | HEIGHT: 69 IN | SYSTOLIC BLOOD PRESSURE: 130 MMHG | HEART RATE: 69 BPM | WEIGHT: 221.1 LBS

## 2023-07-21 DIAGNOSIS — I47.10 SVT (SUPRAVENTRICULAR TACHYCARDIA) (H): Primary | ICD-10-CM

## 2023-07-21 PROCEDURE — G0463 HOSPITAL OUTPT CLINIC VISIT: HCPCS | Mod: 25 | Performed by: INTERNAL MEDICINE

## 2023-07-21 PROCEDURE — 99215 OFFICE O/P EST HI 40 MIN: CPT | Performed by: INTERNAL MEDICINE

## 2023-07-21 PROCEDURE — 93010 ELECTROCARDIOGRAM REPORT: CPT | Mod: 59 | Performed by: INTERNAL MEDICINE

## 2023-07-21 PROCEDURE — 93005 ELECTROCARDIOGRAM TRACING: CPT

## 2023-07-21 RX ORDER — SODIUM CHLORIDE 9 MG/ML
INJECTION, SOLUTION INTRAVENOUS CONTINUOUS
Status: CANCELLED | OUTPATIENT
Start: 2023-07-21

## 2023-07-21 RX ORDER — DILTIAZEM HYDROCHLORIDE 180 MG/1
180 CAPSULE, EXTENDED RELEASE ORAL DAILY
Qty: 90 CAPSULE | Refills: 1 | Status: SHIPPED | OUTPATIENT
Start: 2023-07-21

## 2023-07-21 RX ORDER — LIDOCAINE 40 MG/G
CREAM TOPICAL
Status: CANCELLED | OUTPATIENT
Start: 2023-07-21

## 2023-07-21 ASSESSMENT — PAIN SCALES - GENERAL: PAINLEVEL: NO PAIN (0)

## 2023-07-21 NOTE — PATIENT INSTRUCTIONS
You were seen in the Electrophysiology Clinic today by: Dr. Benitez    Plan:   Medication Changes: STOP Metoprolol. START Diltiazem 180 mg daily.     Follow up Visit: with Dr. Benitez TBD    Further Instructions: Please contact us when you have decided if you would like to proceed with SVT ablation or not.       If you have further questions, please utilize [employer] to contact us.     Your Care Team:    EP Cardiology   Telephone Number     Nurse Line  Magdaleno Villalta RN    [phone]     For scheduling appointments:   Davi   [phone]   For procedure scheduling:    Angy Cruz     [phone]   For the Device Clinic (Pacemakers, ICDs, Loop Recorders)    During business hours: [phone]  After business hours:   [phone]- select option 4 and ask for job code [id number].       On-call cardiologist for after hours or on weekends:   [phone], option #4, and ask to speak to the on-call cardiologist.     Cardiovascular Clinic:   [address] [place] [address]. [place] [address]      As always, Thank you for trusting us with your health care needs!     You are scheduled for an SVT ablation, at [facility] with Dr. Benitez. The hospital is located at [address] [place] [address] on [place] the [place].  If you need to cancel this procedure, please call [phone].      Visitor Policy: Two visitors.    Date:______  Time: _______________To the Gold Waiting Room at the [facility]  Supraventricular Tachycardia Ablation (SVT)    1. Your history and physical will be completed by our advanced practice provider when you arrive.  2. Please do not eat anything for 8 hours prior to your procedure. You may have sips of water up until 2 hours prior to your arrival.  3. The morning of your procedure, you may take your scheduled medications with a sip of water - continue your blood thinner (warfarin, Pradaxa, Eliquis, Xarelto).  Hold:   - Metoprolol   4. You will discharge the  same day and need a .        Post-Procedure Instructions  Care of groin site:   Remove the Band-Aid after 24 hours. If there is minor oozing, apply another Band-aid and remove it after 12 hours.    Do NOT take a bath, use a hot tub, pool, or submerse in water for at least 3 days. You may shower.    It is normal to have a small bruise or lump at the site.   Do not scrub the site.   Do not use lotion or powder near the puncture site for 3 days.    If you start bleeding from the site in your groin: Lie down flat and press firmly on the site. Call your physician immediately, or, come to the emergency room.  Call 911 right away if you have bleeding that is heavy or does not stop.    Call your doctor/provider if:    You have a large or growing hard lump around the site.    The site is red, swollen, hot or tender.    You have chills or a fever greater than 101 F (38 C).    Blood or fluid is draining from the site.    Your leg or arm turns bluish, feels numb or cool.    You have hives, a rash or unusual itching.      Activity Restrictions   For the first 2 days: Do not stoop or squat. When you cough, sneeze or move your bowels, hold your hand over the puncture site and press gently.   Do not lift more than 10 pounds or exertional activity for 10 days.  - No driving for 24 hours after (with or without general anesthesia).       Date: _______ Follow up with Dr. Benitez.      Please do not hesitate to utilize [employer] or call us if you have any questions or concerns.    Magdaleno Villalta, RN   Cardiology Nurse Coordinator    [phone]    Angy FERNANDEZ Procedure   [phone]

## 2023-07-21 NOTE — PROGRESS NOTES
HPI:   Mr. El Singh is a 58 year old  male with PMH significant for CVA, HLD and SVT.    He was referred by Dr. Brizuela to consider whether he might be a candidate for catheter ablation of SVT.    Zio patch in 2/2023 showed regular SVT corresponding to his symptoms.  He was then put on Toprol 100 mg daily.  Repeat Zio patch in 5/2023 revealed bunch of short SVT episodes.     He stated that stress might be a trigger.  He denied any chest pain, dizziness and palpitation.  He complained of SOB.    PAST MEDICAL HISTORY:  No past medical history on file.    CURRENT MEDICATIONS:  Current Outpatient Medications   Medication Sig Dispense Refill     aspirin (ASA) 325 MG EC tablet Take 325 mg by mouth daily       Cholecalciferol (VITAMIN D-3 PO)        metoprolol succinate ER (TOPROL XL) 100 MG 24 hr tablet Take 1 tablet (100 mg) by mouth daily 90 tablet 3     Multiple Vitamin (MULTI-VITAMIN PO) Take 1 tablet by mouth daily       TURMERIC PO          PAST SURGICAL HISTORY:  Past Surgical History:   Procedure Laterality Date     CIRCUMCISION,CLAMP      Adult     COLONOSCOPY  5/7/2014    Procedure: COLONOSCOPY;  Surgeon: Brian Trinh MD;  Location: [facility] OR     CV CORONARY ANGIOGRAM N/A 2/6/2023    Procedure: Coronary Angiogram;  Surgeon: Ward Quiles MD;  Location: [facility] CARDIAC CATH LAB     ENT SURGERY       HC OPEN TX NOSE FX+OPEN FIX SEPTUM       REMOVAL OF SPERM DUCT(S)      & Reversal       ALLERGIES:   No Known Allergies    FAMILY HISTORY:  - Premature coronary artery disease  - Atrial fibrillation  - Sudden cardiac death     SOCIAL HISTORY:  Social History     Tobacco Use     Smoking status: Never     Smokeless tobacco: Never   Vaping Use     Vaping Use: Never used   Substance Use Topics     Alcohol use: No     Comment: quit      Drug use: No       ROS:   Constitutional: No fever, chills, or sweats. Weight stable.   ENT: No visual disturbance, ear ache, epistaxis, sore throat.  "  Cardiovascular: As per HPI.   Respiratory: No cough, hemoptysis.    GI: No nausea, vomiting, hematemesis, melena, or hematochezia.   : No hematuria.   Integument: Negative.   Psychiatric: Negative.   Hematologic:  Easy bruising, no easy bleeding.  Neuro: Negative.   Endocrinology: No significant heat or cold intolerance   Musculoskeletal: No myalgia.    Exam:  /87 (BP Location: Right arm, Patient Position: Sitting, Cuff Size: Adult Regular)   Pulse 69   Ht 1.76 m (5' 9.29\")   Wt 100.3 kg (221 lb 1.6 oz)   SpO2 94%   BMI 32.38 kg/m    GENERAL APPEARANCE: healthy, alert and no distress  HEENT: no icterus, no xanthelasmas, normal pupil size and reaction, normal palate, mucosa moist, no central cyanosis  NECK: no adenopathy, no asymmetry, masses, or scars, thyroid normal to palpation and no bruits, JVP not elevated  RESPIRATORY: lungs clear to auscultation - no rales, rhonchi or wheezes, no use of accessory muscles, no retractions, respirations are unlabored, normal respiratory rate  CARDIOVASCULAR: regular rhythm, normal S1 with physiologic split S2, no S3 or S4 and no murmur, click or rub, precordium quiet with normal PMI.  ABDOMEN: soft, non tender, without hepatosplenomegaly, no masses palpable, bowel sounds normal, aorta not enlarged by palpation, no abdominal bruits  EXTREMITIES: peripheral pulses normal, no edema, no bruits  NEURO: alert and oriented to person/place/time, normal speech, gait and affect  VASC: Radial, femoral, dorsalis pedis and posterior tibialis pulses are normal in volumes and symmetric bilaterally. No bruits are heard.  SKIN: no ecchymoses, no rashes    Labs:  CBC RESULTS:   Lab Results   Component Value Date    WBC 5.2 02/06/2023    WBC 5.1 04/27/2021    RBC 5.27 02/06/2023    RBC 5.15 04/27/2021    HGB 15.1 02/06/2023    HGB 15.3 04/27/2021    HCT 45.3 02/06/2023    HCT 44.8 04/27/2021    MCV 86 02/06/2023    MCV 87 04/27/2021    MCH 28.7 02/06/2023    MCH 29.7 04/27/2021    " MCHC 33.3 02/06/2023    MCHC 34.2 04/27/2021    RDW 12.6 02/06/2023    RDW 12.8 04/27/2021     02/06/2023     04/27/2021       BMP RESULTS:  Lab Results   Component Value Date     02/06/2023     06/24/2021    POTASSIUM 4.3 02/06/2023    POTASSIUM 4.7 06/24/2021    CHLORIDE 104 02/06/2023    CHLORIDE 109 06/24/2021    CO2 22 02/06/2023    CO2 27 06/24/2021    ANIONGAP 13 02/06/2023    ANIONGAP 5 06/24/2021    GLC 95 02/06/2023    GLC 98 06/24/2021    BUN 11.8 02/06/2023    BUN 21 06/24/2021    CR 0.77 02/06/2023    CR 0.93 06/24/2021    GFRESTIMATED >90 02/06/2023    GFRESTIMATED >90 06/24/2021    GFRESTBLACK >90 06/24/2021    PILI 9.2 02/06/2023    PILI 9.0 06/24/2021        INR RESULTS:  Lab Results   Component Value Date    INR 0.98 02/06/2023       Procedures:  Zio patch in 2/2023: Reviewed.            Zio patch in 5/2023: Reviewed.              ECG on 7/21/2023: Reviewed.      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  Zio patch on Toprol 100 mg daily in 5/2023 revealed bunch of short (< 30 sec) SVT episodes.     We discussed treatment options including attempt of different meds including CCB and Flecainide and catheter ablation.   The nature, risks, benefits, alternatives and anticipated results of the procedure were explained to the patient. These risks include but are not limited to local vascular damage, bleeding, pulmonary vein stenosis, AV block requiring a pacemaker implantation, tamponade and stroke. The patient wished to reflect on this and call us as he decides to proceed with the procedure.   He is interested in catheter ablation but would like to try Diltiazem.  I will put him on Diltiazem 180 mg daily and see how it works.    I spent a total of 45 min today to review the records, see the patient, and complete the documents.    CC  Patient Care Team:  Jose Willson MD as PCP - General (Family Practice)  Matt,  Bety Stein MD as MD (Neurology)  Kerline Brizuela MD as MD (Cardiovascular Disease)  Kerline Brizuela MD as Assigned Heart and Vascular Provider  Oppel, Goldy Mccray PA-C as Assigned PCP  CAN, ILKNUR

## 2023-07-21 NOTE — NURSING NOTE
Chief Complaint   Patient presents with     New Patient     NEW EP referral to discuss SVT ablation          Vitals were taken, medications reconciled and EKG performed.    Luzma Craft CMA  2:14 PM

## 2023-07-21 NOTE — LETTER
7/21/2023      RE: El Singh  [address] [facility] [address]       Dear Colleague,    Thank you for the opportunity to participate in the care of your patient, El Singh, at the [facility] [place] at [facility]. Please see a copy of my visit note below.    HPI:   Mr. El Singh is a 58 year old  male with PMH significant for CVA, HLD and SVT.    He was referred by Dr. Brizuela to consider whether he might be a candidate for catheter ablation of SVT.    Zio patch in 2/2023 showed regular SVT corresponding to his symptoms.  He was then put on Toprol 100 mg daily.  Repeat Zio patch in 5/2023 revealed bunch of short SVT episodes.     He stated that stress might be a trigger.  He denied any chest pain, dizziness and palpitation.  He complained of SOB.    PAST MEDICAL HISTORY:  No past medical history on file.    CURRENT MEDICATIONS:  Current Outpatient Medications   Medication Sig Dispense Refill     aspirin (ASA) 325 MG EC tablet Take 325 mg by mouth daily       Cholecalciferol (VITAMIN D-3 PO)        metoprolol succinate ER (TOPROL XL) 100 MG 24 hr tablet Take 1 tablet (100 mg) by mouth daily 90 tablet 3     Multiple Vitamin (MULTI-VITAMIN PO) Take 1 tablet by mouth daily       TURMERIC PO          PAST SURGICAL HISTORY:  Past Surgical History:   Procedure Laterality Date     CIRCUMCISION,CLAMP      Adult     COLONOSCOPY  5/7/2014    Procedure: COLONOSCOPY;  Surgeon: Brian Trinh MD;  Location:  OR     CV CORONARY ANGIOGRAM N/A 2/6/2023    Procedure: Coronary Angiogram;  Surgeon: Ward Quiles MD;  Location: [facility] CARDIAC CATH LAB     ENT SURGERY       HC OPEN TX NOSE FX+OPEN FIX SEPTUM       REMOVAL OF SPERM DUCT(S)      & Reversal       ALLERGIES:   No Known Allergies    FAMILY HISTORY:  - Premature coronary artery disease  - Atrial fibrillation  - Sudden cardiac death     SOCIAL HISTORY:  Social  "History     Tobacco Use     Smoking status: Never     Smokeless tobacco: Never   Vaping Use     Vaping Use: Never used   Substance Use Topics     Alcohol use: No     Comment: quit      Drug use: No       ROS:   Constitutional: No fever, chills, or sweats. Weight stable.   ENT: No visual disturbance, ear ache, epistaxis, sore throat.   Cardiovascular: As per HPI.   Respiratory: No cough, hemoptysis.    GI: No nausea, vomiting, hematemesis, melena, or hematochezia.   : No hematuria.   Integument: Negative.   Psychiatric: Negative.   Hematologic:  Easy bruising, no easy bleeding.  Neuro: Negative.   Endocrinology: No significant heat or cold intolerance   Musculoskeletal: No myalgia.    Exam:  /87 (BP Location: Right arm, Patient Position: Sitting, Cuff Size: Adult Regular)   Pulse 69   Ht 1.76 m (5' 9.29\")   Wt 100.3 kg (221 lb 1.6 oz)   SpO2 94%   BMI 32.38 kg/m    GENERAL APPEARANCE: healthy, alert and no distress  HEENT: no icterus, no xanthelasmas, normal pupil size and reaction, normal palate, mucosa moist, no central cyanosis  NECK: no adenopathy, no asymmetry, masses, or scars, thyroid normal to palpation and no bruits, JVP not elevated  RESPIRATORY: lungs clear to auscultation - no rales, rhonchi or wheezes, no use of accessory muscles, no retractions, respirations are unlabored, normal respiratory rate  CARDIOVASCULAR: regular rhythm, normal S1 with physiologic split S2, no S3 or S4 and no murmur, click or rub, precordium quiet with normal PMI.  ABDOMEN: soft, non tender, without hepatosplenomegaly, no masses palpable, bowel sounds normal, aorta not enlarged by palpation, no abdominal bruits  EXTREMITIES: peripheral pulses normal, no edema, no bruits  NEURO: alert and oriented to person/place/time, normal speech, gait and affect  VASC: Radial, femoral, dorsalis pedis and posterior tibialis pulses are normal in volumes and symmetric bilaterally. No bruits are heard.  SKIN: no ecchymoses, no " laly    Labs:  CBC RESULTS:   Lab Results   Component Value Date    WBC 5.2 02/06/2023    WBC 5.1 04/27/2021    RBC 5.27 02/06/2023    RBC 5.15 04/27/2021    HGB 15.1 02/06/2023    HGB 15.3 04/27/2021    HCT 45.3 02/06/2023    HCT 44.8 04/27/2021    MCV 86 02/06/2023    MCV 87 04/27/2021    MCH 28.7 02/06/2023    MCH 29.7 04/27/2021    MCHC 33.3 02/06/2023    MCHC 34.2 04/27/2021    RDW 12.6 02/06/2023    RDW 12.8 04/27/2021     02/06/2023     04/27/2021       BMP RESULTS:  Lab Results   Component Value Date     02/06/2023     06/24/2021    POTASSIUM 4.3 02/06/2023    POTASSIUM 4.7 06/24/2021    CHLORIDE 104 02/06/2023    CHLORIDE 109 06/24/2021    CO2 22 02/06/2023    CO2 27 06/24/2021    ANIONGAP 13 02/06/2023    ANIONGAP 5 06/24/2021    GLC 95 02/06/2023    GLC 98 06/24/2021    BUN 11.8 02/06/2023    BUN 21 06/24/2021    CR 0.77 02/06/2023    CR 0.93 06/24/2021    GFRESTIMATED >90 02/06/2023    GFRESTIMATED >90 06/24/2021    GFRESTBLACK >90 06/24/2021    PILI 9.2 02/06/2023    PILI 9.0 06/24/2021        INR RESULTS:  Lab Results   Component Value Date    INR 0.98 02/06/2023       Procedures:  Zio patch in 2/2023: Reviewed.            Zio patch in 5/2023: Reviewed.              ECG on 7/21/2023: Reviewed.      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  Zio patch on Toprol 100 mg daily in 5/2023 revealed bunch of short (< 30 sec) SVT episodes.     We discussed treatment options including attempt of different meds including CCB and Flecainide and catheter ablation.   The nature, risks, benefits, alternatives and anticipated results of the procedure were explained to the patient. These risks include but are not limited to local vascular damage, bleeding, pulmonary vein stenosis, AV block requiring a pacemaker implantation, tamponade and stroke. The patient wished to reflect on this and call us as he decides to proceed with the  procedure.   He is interested in catheter ablation but would like to try Diltiazem.  I will put him on Diltiazem 180 mg daily and see how it works.    I spent a total of 45 min today to review the records, see the patient, and complete the documents.    CC  Patient Care Team:  Jose Willson MD as PCP - General ([facility])  Bety Gutierrez MD as MD (Neurology)  Samuel Grimm MD as MD (Cardiovascular Disease)  Samuel Grimm MD as Assigned Heart and Vascular Provider  OppelGoldy PA-C as Assigned PCP  SAMUEL GRIMM      Please do not hesitate to contact me if you have any questions/concerns.     Sincerely,     Lizbeth Benitez MD

## 2023-07-24 LAB
ATRIAL RATE - MUSE: 74 BPM
DIASTOLIC BLOOD PRESSURE - MUSE: NORMAL MMHG
INTERPRETATION ECG - MUSE: NORMAL
P AXIS - MUSE: 49 DEGREES
PR INTERVAL - MUSE: 152 MS
QRS DURATION - MUSE: 88 MS
QT - MUSE: 390 MS
QTC - MUSE: 432 MS
R AXIS - MUSE: 74 DEGREES
SYSTOLIC BLOOD PRESSURE - MUSE: NORMAL MMHG
T AXIS - MUSE: 31 DEGREES
VENTRICULAR RATE- MUSE: 74 BPM

## 2023-08-16 ENCOUNTER — TELEPHONE (OUTPATIENT)
Dept: CARDIOLOGY | Facility: CLINIC | Age: 58
End: 2023-08-16
Payer: COMMERCIAL

## 2023-08-16 NOTE — TELEPHONE ENCOUNTER
EP Scheduling called the patient to schedule Ablation with Dr. Benitez first week of October,as requested by the patient. The number [phone] was left for the patient to return the call and schedule the procedure.    Angy Cruz  [facility] Electrophysiology   [phone]

## 2023-08-22 NOTE — TELEPHONE ENCOUNTER
Left another message regarding scheduling. He was alerted that Dr. Benitez is booked out past the 1st week of October. A message had been left on the 16th and again today the 22nd and a mychart sent on the 16th.     Angy Cruz  [facility] Electrophysiology   [phone]

## 2023-08-31 NOTE — TELEPHONE ENCOUNTER
Another message left for the patient to book his procedure.      Angy Cruz  [facility] Electrophysiology   [phone]

## 2023-09-10 ENCOUNTER — HEALTH MAINTENANCE LETTER (OUTPATIENT)
Age: 58
End: 2023-09-10

## 2023-09-13 NOTE — TELEPHONE ENCOUNTER
Left another message for patient to book his procedure. He is not responding. I will send a letter and will stop calling. I will await his call when he's ready to schedule     Angy Cruz  [facility] Electrophysiology   [phone]

## 2023-09-29 ENCOUNTER — TRANSFERRED RECORDS (OUTPATIENT)
Dept: HEALTH INFORMATION MANAGEMENT | Facility: CLINIC | Age: 58
End: 2023-09-29
Payer: COMMERCIAL

## 2023-10-30 ENCOUNTER — OFFICE VISIT (OUTPATIENT)
Dept: CARDIOLOGY | Facility: CLINIC | Age: 58
End: 2023-10-30
Payer: COMMERCIAL

## 2023-10-30 VITALS
HEART RATE: 66 BPM | WEIGHT: 220 LBS | SYSTOLIC BLOOD PRESSURE: 142 MMHG | DIASTOLIC BLOOD PRESSURE: 84 MMHG | OXYGEN SATURATION: 99 % | BODY MASS INDEX: 32.58 KG/M2 | HEIGHT: 69 IN

## 2023-10-30 DIAGNOSIS — Z86.73 HISTORY OF CVA (CEREBROVASCULAR ACCIDENT): ICD-10-CM

## 2023-10-30 DIAGNOSIS — I47.10 SVT (SUPRAVENTRICULAR TACHYCARDIA) (H): Primary | ICD-10-CM

## 2023-10-30 PROCEDURE — 99214 OFFICE O/P EST MOD 30 MIN: CPT | Performed by: INTERNAL MEDICINE

## 2023-10-30 NOTE — PROGRESS NOTES
HPI: Purpose of visit: I am seeing patient in follow-up for SVT    Patient is a 58-year-old gentleman with a past medical history that includes a stroke in March 2021 with recurrence in April 2021.  More recently, he has been seen for supraventricular tachycardia.    Patient was hospitalized in [place] from March 28 to March 29, 2021.  A brain MRI showed an 8 mm lacunar infarct in the central left thalamus.  Patient had symptoms of right facial, right arm and right leg numbness.  Those symptoms have resolved.  Hypercoagulable work-up showed ALLEGRA 1:320 homogeneous pattern, negative anticardiolipin, negative lupus anticoagulant, negative beta-2 microglobulin, elevated protein S activity, low Antithrombin activity, normal protein C activity.  A hematology consult was planned but was never accomplished.    More recently, patient has been affected by palpitation episodes.  Patient wore a Zio patch monitor and February as well as May 2023.  Both Zio patch monitor showed very frequent supraventricular tachycardia episodes, consistent with AVNRT.  The longest duration of SVT on the February Zio Patch monitor was more than 2 minutes.      He was previously on metoprolol but that has been discontinued and diltiazem was started.  The metoprolol was discontinued because of fatigue.  The diltiazem has been somewhat effective and reducing frequency and duration of SVT episodes.    PAST MEDICAL HISTORY:  History reviewed. No pertinent past medical history.    CURRENT MEDICATIONS:  Current Outpatient Medications   Medication Sig Dispense Refill    aspirin (ASA) 325 MG EC tablet Take 325 mg by mouth daily      diltiazem ER (DILT-XR) 180 MG 24 hr capsule Take 1 capsule (180 mg) by mouth daily 90 capsule 1    Multiple Vitamin (MULTI-VITAMIN PO) Take 1 tablet by mouth daily      Cholecalciferol (VITAMIN D-3 PO)  (Patient not taking: Reported on 10/30/2023)      TURMERIC PO  (Patient not taking: Reported on 10/30/2023)         PAST  "SURGICAL HISTORY:  Past Surgical History:   Procedure Laterality Date    CIRCUMCISION,CLAMP      Adult    COLONOSCOPY  5/7/2014    Procedure: COLONOSCOPY;  Surgeon: Brian Trinh MD;  Location: [facility] OR    CV CORONARY ANGIOGRAM N/A 2/6/2023    Procedure: Coronary Angiogram;  Surgeon: Ward Quiles MD;  Location:  HEART CARDIAC CATH LAB    ENT SURGERY      HC OPEN TX NOSE FX+OPEN FIX SEPTUM      REMOVAL OF SPERM DUCT(S)      & Reversal       ALLERGIES:   No Known Allergies    FAMILY HISTORY:  - Premature coronary artery disease  - Atrial fibrillation  - Sudden cardiac death     SOCIAL HISTORY:  Social History     Tobacco Use    Smoking status: Never    Smokeless tobacco: Never   Vaping Use    Vaping Use: Never used   Substance Use Topics    Alcohol use: No     Comment: quit     Drug use: No       ROS:   Constitutional: No fever, chills, or sweats. Weight stable.   ENT: No visual disturbance, ear ache, epistaxis, sore throat.   Cardiovascular: As per HPI.   Respiratory: No cough, hemoptysis.    GI: No nausea, vomiting, hematemesis, melena, or hematochezia.   : No hematuria.   Integument: Negative.   Psychiatric: Negative.   Hematologic:  Easy bruising, no easy bleeding.  Neuro: Negative.   Endocrinology: No significant heat or cold intolerance   Musculoskeletal: No myalgia.    Exam:  BP (!) 142/84   Pulse 66   Ht 1.759 m (5' 9.25\")   Wt 99.8 kg (220 lb)   SpO2 99%   BMI 32.25 kg/m    GENERAL APPEARANCE: healthy, alert and no distress  HEENT: no icterus, no xanthelasmas, normal pupil size and reaction, normal palate, mucosa moist, no central cyanosis  NECK: no adenopathy, no asymmetry, masses, or scars, thyroid normal to palpation and no bruits, JVP not elevated  RESPIRATORY: lungs clear to auscultation - no rales, rhonchi or wheezes, no use of accessory muscles, no retractions, respirations are unlabored, normal respiratory rate  CARDIOVASCULAR: regular rhythm, normal S1 with " physiologic split S2, no S3 or S4 and no murmur, click or rub, precordium quiet with normal PMI.  ABDOMEN: soft, non tender, without hepatosplenomegaly, no masses palpable, bowel sounds normal, aorta not enlarged by palpation, no abdominal bruits  EXTREMITIES: peripheral pulses normal, no edema, no bruits  NEURO: alert and oriented to person/place/time, normal speech, gait and affect  VASC: Radial, femoral, dorsalis pedis and posterior tibialis pulses are normal in volumes and symmetric bilaterally. No bruits are heard.  SKIN: no ecchymoses, no rashes    Labs:  CBC RESULTS:   Lab Results   Component Value Date    WBC 5.2 02/06/2023    WBC 5.1 04/27/2021    RBC 5.27 02/06/2023    RBC 5.15 04/27/2021    HGB 15.1 02/06/2023    HGB 15.3 04/27/2021    HCT 45.3 02/06/2023    HCT 44.8 04/27/2021    MCV 86 02/06/2023    MCV 87 04/27/2021    MCH 28.7 02/06/2023    MCH 29.7 04/27/2021    MCHC 33.3 02/06/2023    MCHC 34.2 04/27/2021    RDW 12.6 02/06/2023    RDW 12.8 04/27/2021     02/06/2023     04/27/2021       BMP RESULTS:  Lab Results   Component Value Date     02/06/2023     06/24/2021    POTASSIUM 4.3 02/06/2023    POTASSIUM 4.7 06/24/2021    CHLORIDE 104 02/06/2023    CHLORIDE 109 06/24/2021    CO2 22 02/06/2023    CO2 27 06/24/2021    ANIONGAP 13 02/06/2023    ANIONGAP 5 06/24/2021    GLC 95 02/06/2023    GLC 98 06/24/2021    BUN 11.8 02/06/2023    BUN 21 06/24/2021    CR 0.77 02/06/2023    CR 0.93 06/24/2021    GFRESTIMATED >90 02/06/2023    GFRESTIMATED >90 06/24/2021    GFRESTBLACK >90 06/24/2021    PILI 9.2 02/06/2023    PILI 9.0 06/24/2021        INR RESULTS:  Lab Results   Component Value Date    INR 0.98 02/06/2023       Procedures:      Assessment and Plan:     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  I discussed the aims, risks, and alternatives to catheter  ablation for SVT.  I recommended the catheter ablation to be performed under general anesthesia and guided by CARTO.    I explained to patient that risks of EP study and ablation procedure include, but are not limited to vascular injury, excessive bleeding requiring blood transfusion, AV node injury requiring permanent pacemaker implantation, cardiac tamponade requiring pericardiocentesis or open heart surgery, TIA, stroke, or death. Patient understood the risks and decided to proceed with procedure.    Before proceeding to the ablation, we will expedite hematology referral to assess possible hypercoagulable syndrome.  This is important to determine whether or not oral anticoagulation should be started to minimize any thromboembolic events at the time of the ablation.    All questions concerns were addressed and patient and his wife are happy with the plan.               CC  Patient Care Team:  Jose Willson MD as PCP - General (Family Practice)  Bety Gutierrez MD as MD (Neurology)  Kerline Brizuela MD as MD (Cardiovascular Disease)  Kerline Brizuela MD as Assigned Heart and Vascular Provider  Jose Willson MD as Assigned PCP

## 2023-10-30 NOTE — NURSING NOTE
"Chief Complaint   Patient presents with    Follow Up       Initial BP (!) 143/94   Pulse 66   Ht 1.759 m (5' 9.25\")   Wt 99.8 kg (220 lb)   SpO2 99%   BMI 32.25 kg/m   Estimated body mass index is 32.25 kg/m  as calculated from the following:    Height as of this encounter: 1.759 m (5' 9.25\").    Weight as of this encounter: 99.8 kg (220 lb)..  BP completed using cuff size: nahed Shearer CMA (AAMA)    "

## 2023-10-30 NOTE — PATIENT INSTRUCTIONS
Thank you for coming to the [facility] at [place]; please note the following instructions:     Hematology evaluation to discuss possible blood clotting disorder    2.    The plan is to tentatively schedule the SVT ablation in December 2023      If you have any questions regarding your visit, please contact your care team:     CARDIOLOGY  TELEPHONE NUMBER   Vani THIBODEAUX, Registered Nurse  Chantel AUSTIN, Registered Nurse  Linsey BANKS, Registered Medical Assistant  Holly OSULLIVAN, Certified Medical Assistant  Laurel SCHWARTZ, Visit Facilitator [phone] (select option 1)    *After hours: [phone]   For Scheduling Appts:     [phone] (select option 1)    *After hours: [phone]   For the Device Clinic (Pacemakers and ICD's)  Ashley HE, Registered Nurse   During business hours: [phone]    *After business hours:  [phone] (select option 4)      Normal test result notifications will be released via [employer] or mailed within 7 business days.  All other test results, will be communicated via telephone once reviewed by your cardiologist.    If you need a medication refill, please contact your pharmacy.  Please allow 3 business days for your refill to be completed.    As always, thank you for trusting us with your health care needs!

## 2023-10-30 NOTE — LETTER
10/30/2023      RE: El Singh  [address] [facility] [address]       Dear Colleague,    Thank you for the opportunity to participate in the care of your patient, El Singh, at the [facility]. Please see a copy of my visit note below.    HPI: Purpose of visit: I am seeing patient in follow-up for SVT    Patient is a 58-year-old gentleman with a past medical history that includes a stroke in March 2021 with recurrence in April 2021.  More recently, he has been seen for supraventricular tachycardia.    Patient was hospitalized in [place] from March 28 to March 29, 2021.  A brain MRI showed an 8 mm lacunar infarct in the central left thalamus.  Patient had symptoms of right facial, right arm and right leg numbness.  Those symptoms have resolved.  Hypercoagulable work-up showed ALLEGRA 1:320 homogeneous pattern, negative anticardiolipin, negative lupus anticoagulant, negative beta-2 microglobulin, elevated protein S activity, low Antithrombin activity, normal protein C activity.  A hematology consult was planned but was never accomplished.    More recently, patient has been affected by palpitation episodes.  Patient wore a Zio patch monitor and February as well as May 2023.  Both Zio patch monitor showed very frequent supraventricular tachycardia episodes, consistent with AVNRT.  The longest duration of SVT on the February Zio Patch monitor was more than 2 minutes.      He was previously on metoprolol but that has been discontinued and diltiazem was started.  The metoprolol was discontinued because of fatigue.  The diltiazem has been somewhat effective and reducing frequency and duration of SVT episodes.    PAST MEDICAL HISTORY:  History reviewed. No pertinent past medical history.    CURRENT MEDICATIONS:  Current Outpatient Medications   Medication Sig Dispense Refill    aspirin (ASA) 325 MG EC tablet Take 325 mg by  "mouth daily      diltiazem ER (DILT-XR) 180 MG 24 hr capsule Take 1 capsule (180 mg) by mouth daily 90 capsule 1    Multiple Vitamin (MULTI-VITAMIN PO) Take 1 tablet by mouth daily      Cholecalciferol (VITAMIN D-3 PO)  (Patient not taking: Reported on 10/30/2023)      TURMERIC PO  (Patient not taking: Reported on 10/30/2023)         PAST SURGICAL HISTORY:  Past Surgical History:   Procedure Laterality Date    CIRCUMCISION,CLAMP      Adult    COLONOSCOPY  5/7/2014    Procedure: COLONOSCOPY;  Surgeon: Brian Trinh MD;  Location: [facility] OR    CV CORONARY ANGIOGRAM N/A 2/6/2023    Procedure: Coronary Angiogram;  Surgeon: Ward Quiles MD;  Location:  HEART CARDIAC CATH LAB    ENT SURGERY      HC OPEN TX NOSE FX+OPEN FIX SEPTUM      REMOVAL OF SPERM DUCT(S)      & Reversal       ALLERGIES:   No Known Allergies    FAMILY HISTORY:  - Premature coronary artery disease  - Atrial fibrillation  - Sudden cardiac death     SOCIAL HISTORY:  Social History     Tobacco Use    Smoking status: Never    Smokeless tobacco: Never   Vaping Use    Vaping Use: Never used   Substance Use Topics    Alcohol use: No     Comment: quit     Drug use: No       ROS:   Constitutional: No fever, chills, or sweats. Weight stable.   ENT: No visual disturbance, ear ache, epistaxis, sore throat.   Cardiovascular: As per HPI.   Respiratory: No cough, hemoptysis.    GI: No nausea, vomiting, hematemesis, melena, or hematochezia.   : No hematuria.   Integument: Negative.   Psychiatric: Negative.   Hematologic:  Easy bruising, no easy bleeding.  Neuro: Negative.   Endocrinology: No significant heat or cold intolerance   Musculoskeletal: No myalgia.    Exam:  BP (!) 142/84   Pulse 66   Ht 1.759 m (5' 9.25\")   Wt 99.8 kg (220 lb)   SpO2 99%   BMI 32.25 kg/m    GENERAL APPEARANCE: healthy, alert and no distress  HEENT: no icterus, no xanthelasmas, normal pupil size and reaction, normal palate, mucosa moist, no central " cyanosis  NECK: no adenopathy, no asymmetry, masses, or scars, thyroid normal to palpation and no bruits, JVP not elevated  RESPIRATORY: lungs clear to auscultation - no rales, rhonchi or wheezes, no use of accessory muscles, no retractions, respirations are unlabored, normal respiratory rate  CARDIOVASCULAR: regular rhythm, normal S1 with physiologic split S2, no S3 or S4 and no murmur, click or rub, precordium quiet with normal PMI.  ABDOMEN: soft, non tender, without hepatosplenomegaly, no masses palpable, bowel sounds normal, aorta not enlarged by palpation, no abdominal bruits  EXTREMITIES: peripheral pulses normal, no edema, no bruits  NEURO: alert and oriented to person/place/time, normal speech, gait and affect  VASC: Radial, femoral, dorsalis pedis and posterior tibialis pulses are normal in volumes and symmetric bilaterally. No bruits are heard.  SKIN: no ecchymoses, no rashes    Labs:  CBC RESULTS:   Lab Results   Component Value Date    WBC 5.2 02/06/2023    WBC 5.1 04/27/2021    RBC 5.27 02/06/2023    RBC 5.15 04/27/2021    HGB 15.1 02/06/2023    HGB 15.3 04/27/2021    HCT 45.3 02/06/2023    HCT 44.8 04/27/2021    MCV 86 02/06/2023    MCV 87 04/27/2021    MCH 28.7 02/06/2023    MCH 29.7 04/27/2021    MCHC 33.3 02/06/2023    MCHC 34.2 04/27/2021    RDW 12.6 02/06/2023    RDW 12.8 04/27/2021     02/06/2023     04/27/2021       BMP RESULTS:  Lab Results   Component Value Date     02/06/2023     06/24/2021    POTASSIUM 4.3 02/06/2023    POTASSIUM 4.7 06/24/2021    CHLORIDE 104 02/06/2023    CHLORIDE 109 06/24/2021    CO2 22 02/06/2023    CO2 27 06/24/2021    ANIONGAP 13 02/06/2023    ANIONGAP 5 06/24/2021    GLC 95 02/06/2023    GLC 98 06/24/2021    BUN 11.8 02/06/2023    BUN 21 06/24/2021    CR 0.77 02/06/2023    CR 0.93 06/24/2021    GFRESTIMATED >90 02/06/2023    GFRESTIMATED >90 06/24/2021    GFRESTBLACK >90 06/24/2021    PILI 9.2 02/06/2023    PILI 9.0 06/24/2021        INR  RESULTS:  Lab Results   Component Value Date    INR 0.98 02/06/2023       Procedures:      Assessment and Plan:     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  I discussed the aims, risks, and alternatives to catheter ablation for SVT.  I recommended the catheter ablation to be performed under general anesthesia and guided by CARTO.    I explained to patient that risks of EP study and ablation procedure include, but are not limited to vascular injury, excessive bleeding requiring blood transfusion, AV node injury requiring permanent pacemaker implantation, cardiac tamponade requiring pericardiocentesis or open heart surgery, TIA, stroke, or death. Patient understood the risks and decided to proceed with procedure.    Before proceeding to the ablation, we will expedite hematology referral to assess possible hypercoagulable syndrome.  This is important to determine whether or not oral anticoagulation should be started to minimize any thromboembolic events at the time of the ablation.    All questions concerns were addressed and patient and his wife are happy with the plan.               CC  Patient Care Team:  Jose Willson MD as PCP - General (Family Practice)  Bety Gutierrez MD as MD (Neurology)  Kerline Brizuela MD as MD (Cardiovascular Disease)  Kerline Brizuela MD as Assigned Heart and Vascular Provider  Jose Willson MD as Assigned PCP            Please do not hesitate to contact me if you have any questions/concerns.     Sincerely,     Natali Castro MD

## 2023-11-01 ENCOUNTER — TELEPHONE (OUTPATIENT)
Dept: CARDIOLOGY | Facility: CLINIC | Age: 58
End: 2023-11-01

## 2023-11-01 NOTE — TELEPHONE ENCOUNTER
A call was placed to hematology to follow up on the request and the writer was directed to call [phone] and speak to someone in the bleeding and clotting department of hematology. A message was left on that line.     Angy Cruz  [facility]   [phone]

## 2023-11-01 NOTE — TELEPHONE ENCOUNTER
Patient calling to report he has not heard from hematology.    EP  called the hematology clinic on October 30 to get patient seen per Dr. Natali Castro's request prior to surgery.  Patient is scheduled for surgery on December 19, 2023 with Dr. Natali Castro.     EP  will reach out again to hematology to see if we can get the patient worked up prior to his surgery.     Angy Cruz  [facility]   [phone]

## 2023-11-09 ENCOUNTER — OFFICE VISIT (OUTPATIENT)
Dept: HEMATOLOGY | Facility: CLINIC | Age: 58
End: 2023-11-09
Attending: INTERNAL MEDICINE
Payer: COMMERCIAL

## 2023-11-09 VITALS
HEART RATE: 67 BPM | WEIGHT: 219 LBS | DIASTOLIC BLOOD PRESSURE: 84 MMHG | SYSTOLIC BLOOD PRESSURE: 118 MMHG | TEMPERATURE: 97.9 F | BODY MASS INDEX: 32.44 KG/M2 | HEIGHT: 69 IN | OXYGEN SATURATION: 95 %

## 2023-11-09 DIAGNOSIS — Z86.73 HISTORY OF CVA (CEREBROVASCULAR ACCIDENT): ICD-10-CM

## 2023-11-09 DIAGNOSIS — I47.10 SVT (SUPRAVENTRICULAR TACHYCARDIA) (H): ICD-10-CM

## 2023-11-09 PROCEDURE — 99203 OFFICE O/P NEW LOW 30 MIN: CPT | Performed by: INTERNAL MEDICINE

## 2023-11-09 PROCEDURE — G0463 HOSPITAL OUTPT CLINIC VISIT: HCPCS | Performed by: INTERNAL MEDICINE

## 2023-11-09 NOTE — PROGRESS NOTES
[facility]  [address] [place] [id number]  Phone: [phone], Fax: [phone]    Outpatient Visit Note:    Patient: El Singh  MRN: [id number]  : 1965  ROSE MARIE: 2023     Reason for Consultation:  eval thrombophilia, history of stroke    Assessment: El Singh is a 58 year old man with a history of lacunar stroke in , secondary to known hypertension and dyslipidemia.  He has no evidence of antiphospholipid antibody syndrome and his stroke is not consistent with large vessel thrombosis seen with PNH or myeloproliferative neoplasms.  Given his visit in  where his stroke neurologist specifically indicated there is no need for further thrombophilia work-up and that this was consistent with lacunar stroke and known risk factors for such stroke, I also agree that no further work-up is indicated.    Recommendations:  I counseled the patient about my assessment of underlying cause of his lacunar stroke, which is known to be caused by cardiovascular risk factors and small vessel disease, and is best treated with antiplatelet therapy which she is currently on.  No further testing is required and he can proceed with his AVNRT ablation    35 minutes spent by me on the date of the encounter doing chart review, review of outside records, review of test results, interpretation of tests, patient visit, and documentation     Singh Virk MD   of Medicine, Division of Hematology, Oncology and Transplantation  [employer] [place] [employer]     -------------------------------  History: El Singh is a 58 year old man with a history of lacunar stroke who presents today for consideration of thrombophilia work-up.  He presented in 2021 to a emergency department and [facility] while on vacation after he developed right arm numbness and tingling.  MRI scan revealed the presence of a lacunar stroke.   He was started on aspirin and counseled regarding risk factor management.  He also presented in April 2021 here in [place] repeat scan revealed no change in it was felt that these were symptoms consistent with his prior stroke.  Since then, he has been on aspirin for secondary prophylaxis and had aggressive risk factor control.      He saw Dr. Yadi Gutierrez of [facility] stroke neurology in July 2021.  Her opinion was that this was clearly caused by underlying cardiovascular risk factors including hypertension and dyslipidemia.  His BMI is also greater than 30 and suggestive of metabolic syndrome.    Today he reports he is feeling well with no complaints.  He is anxious to get his ablation procedure and needs clearance for this today.  He does note that several members of his family are known to have heterozygosity for factor V Leiden.  He personally has never had a DVT or pulmonary embolism.    Current Outpatient Medications   Medication    aspirin (ASA) 325 MG EC tablet    diltiazem ER (DILT-XR) 180 MG 24 hr capsule    Multiple Vitamin (MULTI-VITAMIN PO)     No current facility-administered medications for this visit.       Physical Exam:  Vitals: B/P: 118/84, T: 97.9, P: 67, R: Data Unavailable, Wt: 219 lbs 0 oz Body mass index is 32.34 kg/m .   Exam:   Gen: Appears well, no distress  HEENT: no scleral icterus or hemorrhage, no wet purpura, no lymphadenopathy  CV: regular, no murmurs  Pulm: clear  Abd: soft, nontender, no splenomegaly  Ext: no edema  Skin: no ecchymoses or hematomas  Neuro: no focal deficits, affect and cognition are normal    Labs:  Reviewed labs from [place] in 2021  APS testing negative   Antithrombin is low but likely from acute stroke not low enough to be concerning for AT deficiency as the cause for stroke  Protein S is normal    Imaging:  Reviewed MRI from 2021

## 2023-11-21 ENCOUNTER — MYC MEDICAL ADVICE (OUTPATIENT)
Dept: CARDIOLOGY | Facility: CLINIC | Age: 58
End: 2023-11-21
Payer: COMMERCIAL

## 2023-12-04 NOTE — TELEPHONE ENCOUNTER
Per Dr. Castro, patient does not need anticoagulation prior to the SVT ablation.  Patient is to continue aspirin.    Vani Christy RN  Cardiology Care Coordinator  [facility]  [phone] option 1

## 2023-12-11 NOTE — TELEPHONE ENCOUNTER
Spoke to patient and reviewed SVT ablation instructions listed below in the mychart messages.  Patient verbalized understanding.    Vani Christy, RN  Cardiology Care Coordinator  [facility]  [phone] option 1

## 2023-12-18 ENCOUNTER — ANESTHESIA EVENT (OUTPATIENT)
Dept: CARDIOLOGY | Facility: CLINIC | Age: 58
End: 2023-12-18
Payer: COMMERCIAL

## 2023-12-19 ENCOUNTER — HOSPITAL ENCOUNTER (OUTPATIENT)
Facility: CLINIC | Age: 58
Discharge: HOME | End: 2023-12-19
Attending: INTERNAL MEDICINE | Admitting: INTERNAL MEDICINE
Payer: COMMERCIAL

## 2023-12-19 ENCOUNTER — ANESTHESIA (OUTPATIENT)
Dept: CARDIOLOGY | Facility: CLINIC | Age: 58
End: 2023-12-19
Payer: COMMERCIAL

## 2023-12-19 VITALS
BODY MASS INDEX: 32.49 KG/M2 | SYSTOLIC BLOOD PRESSURE: 115 MMHG | WEIGHT: 219.36 LBS | DIASTOLIC BLOOD PRESSURE: 75 MMHG | HEART RATE: 86 BPM | RESPIRATION RATE: 14 BRPM | TEMPERATURE: 98 F | OXYGEN SATURATION: 98 % | HEIGHT: 69 IN

## 2023-12-19 DIAGNOSIS — I47.10 SVT (SUPRAVENTRICULAR TACHYCARDIA) (H): ICD-10-CM

## 2023-12-19 LAB
ABO/RH(D): NORMAL
ANION GAP SERPL CALCULATED.3IONS-SCNC: 11 MMOL/L (ref 7–15)
ANTIBODY SCREEN: NEGATIVE
BUN SERPL-MCNC: 15.6 MG/DL (ref 8–23)
CALCIUM SERPL-MCNC: 9.1 MG/DL (ref 8.6–10)
CHLORIDE SERPL-SCNC: 106 MMOL/L (ref 98–107)
CREAT SERPL-MCNC: 0.81 MG/DL (ref 0.67–1.17)
DEPRECATED HCO3 PLAS-SCNC: 22 MMOL/L (ref 22–29)
EGFRCR SERPLBLD CKD-EPI 2021: >90 ML/MIN/1.73M2
ERYTHROCYTE [DISTWIDTH] IN BLOOD BY AUTOMATED COUNT: 13.1 % (ref 10–15)
GLUCOSE BLDC GLUCOMTR-MCNC: 95 MG/DL (ref 70–99)
GLUCOSE SERPL-MCNC: 101 MG/DL (ref 70–99)
HCT VFR BLD AUTO: 43 % (ref 40–53)
HGB BLD-MCNC: 14.4 G/DL (ref 13.3–17.7)
MCH RBC QN AUTO: 29.1 PG (ref 26.5–33)
MCHC RBC AUTO-ENTMCNC: 33.5 G/DL (ref 31.5–36.5)
MCV RBC AUTO: 87 FL (ref 78–100)
PLATELET # BLD AUTO: 230 10E3/UL (ref 150–450)
POTASSIUM SERPL-SCNC: 4.1 MMOL/L (ref 3.4–5.3)
RBC # BLD AUTO: 4.94 10E6/UL (ref 4.4–5.9)
SODIUM SERPL-SCNC: 139 MMOL/L (ref 135–145)
SPECIMEN EXPIRATION DATE: NORMAL
WBC # BLD AUTO: 4.6 10E3/UL (ref 4–11)

## 2023-12-19 PROCEDURE — 999N000054 HC STATISTIC EKG NON-CHARGEABLE

## 2023-12-19 PROCEDURE — 370N000017 HC ANESTHESIA TECHNICAL FEE, PER MIN: Performed by: INTERNAL MEDICINE

## 2023-12-19 PROCEDURE — 85027 COMPLETE CBC AUTOMATED: CPT | Performed by: INTERNAL MEDICINE

## 2023-12-19 PROCEDURE — 80048 BASIC METABOLIC PNL TOTAL CA: CPT | Performed by: INTERNAL MEDICINE

## 2023-12-19 PROCEDURE — 86900 BLOOD TYPING SEROLOGIC ABO: CPT | Performed by: ANESTHESIOLOGY

## 2023-12-19 PROCEDURE — 250N000011 HC RX IP 250 OP 636: Performed by: NURSE ANESTHETIST, CERTIFIED REGISTERED

## 2023-12-19 PROCEDURE — 36415 COLL VENOUS BLD VENIPUNCTURE: CPT | Performed by: ANESTHESIOLOGY

## 2023-12-19 PROCEDURE — 999N000134 HC STATISTIC PP CARE STAGE 3: Performed by: NURSE ANESTHETIST, CERTIFIED REGISTERED

## 2023-12-19 PROCEDURE — 93623 PRGRMD STIMJ&PACG IV RX NFS: CPT | Performed by: INTERNAL MEDICINE

## 2023-12-19 PROCEDURE — 93623 PRGRMD STIMJ&PACG IV RX NFS: CPT | Mod: 26 | Performed by: INTERNAL MEDICINE

## 2023-12-19 PROCEDURE — 82962 GLUCOSE BLOOD TEST: CPT

## 2023-12-19 PROCEDURE — 258N000003 HC RX IP 258 OP 636: Performed by: NURSE ANESTHETIST, CERTIFIED REGISTERED

## 2023-12-19 PROCEDURE — C1733 CATH, EP, OTHR THAN COOL-TIP: HCPCS | Performed by: INTERNAL MEDICINE

## 2023-12-19 PROCEDURE — C1732 CATH, EP, DIAG/ABL, 3D/VECT: HCPCS | Performed by: INTERNAL MEDICINE

## 2023-12-19 PROCEDURE — 250N000011 HC RX IP 250 OP 636: Performed by: ANESTHESIOLOGY

## 2023-12-19 PROCEDURE — 93653 COMPRE EP EVAL TX SVT: CPT | Performed by: INTERNAL MEDICINE

## 2023-12-19 PROCEDURE — 250N000009 HC RX 250: Performed by: NURSE ANESTHETIST, CERTIFIED REGISTERED

## 2023-12-19 PROCEDURE — 36415 COLL VENOUS BLD VENIPUNCTURE: CPT | Performed by: INTERNAL MEDICINE

## 2023-12-19 PROCEDURE — 93010 ELECTROCARDIOGRAM REPORT: CPT | Mod: XU | Performed by: INTERNAL MEDICINE

## 2023-12-19 PROCEDURE — 93005 ELECTROCARDIOGRAM TRACING: CPT

## 2023-12-19 PROCEDURE — C1894 INTRO/SHEATH, NON-LASER: HCPCS | Performed by: INTERNAL MEDICINE

## 2023-12-19 PROCEDURE — 93653 COMPRE EP EVAL TX SVT: CPT | Mod: GC | Performed by: INTERNAL MEDICINE

## 2023-12-19 PROCEDURE — 272N000001 HC OR GENERAL SUPPLY STERILE: Performed by: INTERNAL MEDICINE

## 2023-12-19 PROCEDURE — C1730 CATH, EP, 19 OR FEW ELECT: HCPCS | Performed by: INTERNAL MEDICINE

## 2023-12-19 PROCEDURE — 250N000009 HC RX 250: Performed by: INTERNAL MEDICINE

## 2023-12-19 RX ORDER — ONDANSETRON 2 MG/ML
4 INJECTION INTRAMUSCULAR; INTRAVENOUS EVERY 30 MIN PRN
Status: DISCONTINUED | OUTPATIENT
Start: 2023-12-19 | End: 2023-12-19 | Stop reason: HOSPADM

## 2023-12-19 RX ORDER — SODIUM CHLORIDE, SODIUM LACTATE, POTASSIUM CHLORIDE, CALCIUM CHLORIDE 600; 310; 30; 20 MG/100ML; MG/100ML; MG/100ML; MG/100ML
INJECTION, SOLUTION INTRAVENOUS CONTINUOUS
Status: DISCONTINUED | OUTPATIENT
Start: 2023-12-19 | End: 2023-12-19 | Stop reason: HOSPADM

## 2023-12-19 RX ORDER — OXYCODONE AND ACETAMINOPHEN 5; 325 MG/1; MG/1
1 TABLET ORAL EVERY 4 HOURS PRN
Status: DISCONTINUED | OUTPATIENT
Start: 2023-12-19 | End: 2023-12-19 | Stop reason: HOSPADM

## 2023-12-19 RX ORDER — ONDANSETRON 2 MG/ML
INJECTION INTRAMUSCULAR; INTRAVENOUS PRN
Status: DISCONTINUED | OUTPATIENT
Start: 2023-12-19 | End: 2023-12-19

## 2023-12-19 RX ORDER — FENTANYL CITRATE 50 UG/ML
50 INJECTION, SOLUTION INTRAMUSCULAR; INTRAVENOUS EVERY 5 MIN PRN
Status: DISCONTINUED | OUTPATIENT
Start: 2023-12-19 | End: 2023-12-19 | Stop reason: HOSPADM

## 2023-12-19 RX ORDER — SODIUM CHLORIDE, SODIUM LACTATE, POTASSIUM CHLORIDE, CALCIUM CHLORIDE 600; 310; 30; 20 MG/100ML; MG/100ML; MG/100ML; MG/100ML
INJECTION, SOLUTION INTRAVENOUS CONTINUOUS PRN
Status: DISCONTINUED | OUTPATIENT
Start: 2023-12-19 | End: 2023-12-19

## 2023-12-19 RX ORDER — SODIUM CHLORIDE 9 MG/ML
INJECTION, SOLUTION INTRAVENOUS CONTINUOUS
Status: DISCONTINUED | OUTPATIENT
Start: 2023-12-19 | End: 2023-12-19 | Stop reason: HOSPADM

## 2023-12-19 RX ORDER — OXYCODONE HYDROCHLORIDE 10 MG/1
10 TABLET ORAL
Status: DISCONTINUED | OUTPATIENT
Start: 2023-12-19 | End: 2023-12-19 | Stop reason: HOSPADM

## 2023-12-19 RX ORDER — ASPIRIN 325 MG
325 TABLET, DELAYED RELEASE (ENTERIC COATED) ORAL DAILY
Status: DISCONTINUED | OUTPATIENT
Start: 2023-12-19 | End: 2023-12-19 | Stop reason: HOSPADM

## 2023-12-19 RX ORDER — NALOXONE HYDROCHLORIDE 0.4 MG/ML
0.2 INJECTION, SOLUTION INTRAMUSCULAR; INTRAVENOUS; SUBCUTANEOUS
Status: DISCONTINUED | OUTPATIENT
Start: 2023-12-19 | End: 2023-12-19 | Stop reason: HOSPADM

## 2023-12-19 RX ORDER — FENTANYL CITRATE 50 UG/ML
25 INJECTION, SOLUTION INTRAMUSCULAR; INTRAVENOUS EVERY 5 MIN PRN
Status: DISCONTINUED | OUTPATIENT
Start: 2023-12-19 | End: 2023-12-19 | Stop reason: HOSPADM

## 2023-12-19 RX ORDER — OXYCODONE HYDROCHLORIDE 5 MG/1
5 TABLET ORAL
Status: DISCONTINUED | OUTPATIENT
Start: 2023-12-19 | End: 2023-12-19 | Stop reason: HOSPADM

## 2023-12-19 RX ORDER — HYDROMORPHONE HCL IN WATER/PF 6 MG/30 ML
0.2 PATIENT CONTROLLED ANALGESIA SYRINGE INTRAVENOUS EVERY 5 MIN PRN
Status: DISCONTINUED | OUTPATIENT
Start: 2023-12-19 | End: 2023-12-19 | Stop reason: HOSPADM

## 2023-12-19 RX ORDER — ONDANSETRON 4 MG/1
4 TABLET, ORALLY DISINTEGRATING ORAL EVERY 30 MIN PRN
Status: DISCONTINUED | OUTPATIENT
Start: 2023-12-19 | End: 2023-12-19 | Stop reason: HOSPADM

## 2023-12-19 RX ORDER — FENTANYL CITRATE 50 UG/ML
INJECTION, SOLUTION INTRAMUSCULAR; INTRAVENOUS PRN
Status: DISCONTINUED | OUTPATIENT
Start: 2023-12-19 | End: 2023-12-19

## 2023-12-19 RX ORDER — NALOXONE HYDROCHLORIDE 0.4 MG/ML
0.4 INJECTION, SOLUTION INTRAMUSCULAR; INTRAVENOUS; SUBCUTANEOUS
Status: DISCONTINUED | OUTPATIENT
Start: 2023-12-19 | End: 2023-12-19 | Stop reason: HOSPADM

## 2023-12-19 RX ORDER — HYDROMORPHONE HCL IN WATER/PF 6 MG/30 ML
0.4 PATIENT CONTROLLED ANALGESIA SYRINGE INTRAVENOUS EVERY 5 MIN PRN
Status: DISCONTINUED | OUTPATIENT
Start: 2023-12-19 | End: 2023-12-19 | Stop reason: HOSPADM

## 2023-12-19 RX ORDER — LIDOCAINE 40 MG/G
CREAM TOPICAL
Status: DISCONTINUED | OUTPATIENT
Start: 2023-12-19 | End: 2023-12-19 | Stop reason: HOSPADM

## 2023-12-19 RX ORDER — PROPOFOL 10 MG/ML
INJECTION, EMULSION INTRAVENOUS PRN
Status: DISCONTINUED | OUTPATIENT
Start: 2023-12-19 | End: 2023-12-19

## 2023-12-19 RX ORDER — DILTIAZEM HYDROCHLORIDE 180 MG/1
180 CAPSULE, EXTENDED RELEASE ORAL DAILY
Status: DISCONTINUED | OUTPATIENT
Start: 2023-12-19 | End: 2023-12-19 | Stop reason: HOSPADM

## 2023-12-19 RX ORDER — EPHEDRINE SULFATE 50 MG/ML
INJECTION, SOLUTION INTRAMUSCULAR; INTRAVENOUS; SUBCUTANEOUS PRN
Status: DISCONTINUED | OUTPATIENT
Start: 2023-12-19 | End: 2023-12-19

## 2023-12-19 RX ADMIN — ROCURONIUM BROMIDE 20 MG: 50 INJECTION, SOLUTION INTRAVENOUS at 10:01

## 2023-12-19 RX ADMIN — ISOPROTERENOL HYDROCHLORIDE 0.01 MCG/KG/MIN: 0.2 INJECTION, SOLUTION INTRAMUSCULAR; INTRAVENOUS at 10:12

## 2023-12-19 RX ADMIN — SUGAMMADEX 200 MG: 100 INJECTION, SOLUTION INTRAVENOUS at 11:31

## 2023-12-19 RX ADMIN — ROCURONIUM BROMIDE 20 MG: 50 INJECTION, SOLUTION INTRAVENOUS at 09:57

## 2023-12-19 RX ADMIN — PROPOFOL 200 MG: 10 INJECTION, EMULSION INTRAVENOUS at 08:59

## 2023-12-19 RX ADMIN — ONDANSETRON 4 MG: 2 INJECTION INTRAMUSCULAR; INTRAVENOUS at 12:11

## 2023-12-19 RX ADMIN — ROCURONIUM BROMIDE 20 MG: 50 INJECTION, SOLUTION INTRAVENOUS at 10:48

## 2023-12-19 RX ADMIN — SODIUM CHLORIDE, POTASSIUM CHLORIDE, SODIUM LACTATE AND CALCIUM CHLORIDE: 600; 310; 30; 20 INJECTION, SOLUTION INTRAVENOUS at 08:43

## 2023-12-19 RX ADMIN — EPHEDRINE SULFATE 5 MG: 5 INJECTION INTRAVENOUS at 11:25

## 2023-12-19 RX ADMIN — ONDANSETRON 4 MG: 2 INJECTION INTRAMUSCULAR; INTRAVENOUS at 11:19

## 2023-12-19 RX ADMIN — FENTANYL CITRATE 100 MCG: 50 INJECTION INTRAMUSCULAR; INTRAVENOUS at 08:59

## 2023-12-19 RX ADMIN — MIDAZOLAM 2 MG: 1 INJECTION INTRAMUSCULAR; INTRAVENOUS at 08:43

## 2023-12-19 RX ADMIN — FENTANYL CITRATE 50 MCG: 50 INJECTION INTRAMUSCULAR; INTRAVENOUS at 11:22

## 2023-12-19 RX ADMIN — ROCURONIUM BROMIDE 60 MG: 50 INJECTION, SOLUTION INTRAVENOUS at 09:04

## 2023-12-19 RX ADMIN — ROCURONIUM BROMIDE 10 MG: 50 INJECTION, SOLUTION INTRAVENOUS at 10:21

## 2023-12-19 ASSESSMENT — ACTIVITIES OF DAILY LIVING (ADL)
ADLS_ACUITY_SCORE: 35

## 2023-12-19 ASSESSMENT — ENCOUNTER SYMPTOMS: DYSRHYTHMIAS: 1

## 2023-12-19 NOTE — ANESTHESIA PROCEDURE NOTES
Airway       Patient location during procedure: OR       Procedure Start/Stop Times: 12/19/2023 9:06 AM  Staff -        CRNA: Amanda Sahu APRN CRNA       Performed By: CRNAIndications and Patient Condition       Indications for airway management: rimma-procedural       Induction type:intravenous       Mask difficulty assessment: 2 - vent by mask + OA or adjuvant +/- NMBA    Final Airway Details       Final airway type: endotracheal airway       Successful airway: ETT - single and Oral  Endotracheal Airway Details        ETT size (mm): 8.0       Cuffed: yes       Successful intubation technique: direct laryngoscopy       DL Blade Type: Martinez 2       Grade View of Cords: 1       Adjucts: stylet       Position: Right       Measured from: gums/teeth       Secured at (cm): 25       Bite block used: None    Post intubation assessment        Placement verified by: capnometry, equal breath sounds and chest rise        Number of attempts at approach: 1       Number of other approaches attempted: 0       Secured with: tape       Ease of procedure: easy       Dentition: Intact and Unchanged    Medication(s) Administered   Medication Administration Time: 12/19/2023 9:06 AM
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
## 2023-12-19 NOTE — PROGRESS NOTES
D/I/A:  Pt tolerated post ablation recovery well. Pt completed 4 hours of bedrest. Patient tolerated oral intake and liquids, urinated, right groin puncture site is stable with no bleeding and no hematoma. Pt ambulated without any difficulty. Patient has a . A+O x4 and making needs known. Neuros intact. CCL access sites C/D/I; no bleeding or hematoma; CMS intact.  VSSA.  Sinus rhythm monitor.  IV access removed.  Education completed and outlined in AVS or handout. Discharge instructions reviewed with patient. Pt verbalized understanding. Questions answered prior to discharge.  Belongings returned to patient at discharge. Dr. Castro came to see pt at bedside and pt good to discharge per MD.     P: Pt's wife Cande transported patient home.    Patient to follow up with appts as per discharge instruction.

## 2023-12-19 NOTE — ANESTHESIA PREPROCEDURE EVALUATION
Anesthesia Pre-Procedure Evaluation    Patient: El Singh   MRN: [id number] : 1965        Procedure : Procedure(s):  EP Ablation SVT          No past medical history on file.   Past Surgical History:   Procedure Laterality Date    CIRCUMCISION,CLAMP      Adult    COLONOSCOPY  2014    Procedure: COLONOSCOPY;  Surgeon: Brian Trinh MD;  Location:  OR    CV CORONARY ANGIOGRAM N/A 2023    Procedure: Coronary Angiogram;  Surgeon: Ward Quiles MD;  Location:  [facility]    ENT SURGERY      HC OPEN TX NOSE FX+OPEN FIX SEPTUM      REMOVAL OF SPERM DUCT(S)      & Reversal      No Known Allergies   Social History     Tobacco Use    Smoking status: Never    Smokeless tobacco: Never   Substance Use Topics    Alcohol use: No     Comment: quit       Wt Readings from Last 1 Encounters:   23 99.5 kg (219 lb 5.7 oz)        Anesthesia Evaluation   Pt has had prior anesthetic. Type: General.        ROS/MED HX  ENT/Pulmonary:     (+)     HENRY risk factors, snores loudly, hypertension,                                Neurologic:     (+)              TIA, date: 2021, features: Left thalamus lacunar infarct, no residual deficits.                Cardiovascular: Comment: PSVT    (+)  hypertension- -   -  - -                        dysrhythmias, Other, Irregular Heartbeat/Palpitations,            METS/Exercise Tolerance: 3 - Able to walk 1-2 blocks without stopping    Hematologic:  - neg hematologic  ROS     Musculoskeletal:   (+)  arthritis,             GI/Hepatic:  - neg GI/hepatic ROS     Renal/Genitourinary:  - neg Renal ROS     Endo:  - neg endo ROS     Psychiatric/Substance Use:  - neg psychiatric ROS     Infectious Disease:  - neg infectious disease ROS     Malignancy:  - neg malignancy ROS     Other:            Physical Exam    Airway        Mallampati: III   TM distance: > 3 FB   Neck ROM: full   Mouth opening: > 3 cm    Respiratory Devices and  Support         Dental       (+) Multiple crowns, permanant bridges      Cardiovascular             Pulmonary   pulmonary exam normal                OUTSIDE LABS:  CBC:   Lab Results   Component Value Date    WBC 4.6 12/19/2023    WBC 5.2 02/06/2023    HGB 14.4 12/19/2023    HGB 15.1 02/06/2023    HCT 43.0 12/19/2023    HCT 45.3 02/06/2023     12/19/2023     02/06/2023     BMP:   Lab Results   Component Value Date     12/19/2023     02/06/2023    POTASSIUM 4.1 12/19/2023    POTASSIUM 4.3 02/06/2023    CHLORIDE 106 12/19/2023    CHLORIDE 104 02/06/2023    CO2 22 12/19/2023    CO2 22 02/06/2023    BUN 15.6 12/19/2023    BUN 11.8 02/06/2023    CR 0.81 12/19/2023    CR 0.77 02/06/2023    GLC 95 12/19/2023     (H) 12/19/2023     COAGS:   Lab Results   Component Value Date    INR 0.98 02/06/2023     POC:   Lab Results   Component Value Date    BGM 95 04/27/2021     HEPATIC:   Lab Results   Component Value Date    ALBUMIN 3.9 04/27/2021    PROTTOTAL 7.4 04/27/2021    ALT 48 04/27/2021    AST 14 04/27/2021    ALKPHOS 65 04/27/2021    BILITOTAL 0.3 04/27/2021     OTHER:   Lab Results   Component Value Date    PILI 9.1 12/19/2023    CRP <5.0 11/12/2009       Anesthesia Plan    ASA Status:  3    NPO Status:  NPO Appropriate    Anesthesia Type: General.     - Airway: ETT   Induction: Intravenous.   Maintenance: Balanced.   Techniques and Equipment:     - Lines/Monitors: 2nd IV     - Blood: T&S     Consents    Anesthesia Plan(s) and associated risks, benefits, and realistic alternatives discussed. Questions answered and patient/representative(s) expressed understanding.     - Discussed:     - Discussed with:  Patient      - Extended Intubation/Ventilatory Support Discussed: Yes.      - Patient is DNR/DNI Status: No     Use of blood products discussed: Yes.     - Discussed with: Patient.     - Consented: consented to blood products     Postoperative Care    Pain management: Multi-modal analgesia.  "  PONV prophylaxis: Ondansetron (or other 5HT-3)     Comments:              PAC Discussion and Assessment    ASA Classification: 3    Anesthetic techniques and relevant risks discussed: GA  Invasive monitoring and risk discussed: Yes    Possibility and Risk of blood transfusion discussed: Yes  NPO instructions given: NPO after midnight    Needs early admission to pre-op area: No                                            Whitney Dumont MD    I have reviewed the pertinent notes and labs in the chart from the past 30 days and (re)examined the patient.  Any updates or changes from those notes are reflected in this note.             # Drug Induced Platelet Defect: home medication list includes an antiplatelet medication  # Obesity: Estimated body mass index is 32.39 kg/m  as calculated from the following:    Height as of this encounter: 1.753 m (5' 9\").    Weight as of this encounter: 99.5 kg (219 lb 5.7 oz).      "

## 2023-12-19 NOTE — PROGRESS NOTES
D/I/A: Pt roomed on 3C in [place] [facility].  Arrived via litter and accompanied by CCL RN.  SANA. Rhythm upon arrival: Sinus rhythm on monitor. Denies pain or sob.  Reviewed activity restrictions and when to notify RN, ie-changes to breathing or increased chest pressure or chest pain.  CCL access:  Right groin site WNL. No active bleeding or hematoma. Site unchanged from PACU. Dressing marked from dried drainage. Pulse present.     P: Continue to monitor status and notify MD with any changes or concerns. Discharge to home once meeting criteria.

## 2023-12-19 NOTE — ANESTHESIA CARE TRANSFER NOTE
Patient: El Singh    Procedure: Procedure(s):  EP Ablation SVT       Diagnosis: SVT  Diagnosis Additional Information: No value filed.    Anesthesia Type:   General     Note:    Oropharynx: oropharynx clear of all foreign objects and spontaneously breathing  Level of Consciousness: awake  Oxygen Supplementation: face mask  Level of Supplemental Oxygen (L/min / FiO2): 4  Independent Airway: airway patency satisfactory and stable  Dentition: dentition unchanged  Vital Signs Stable: post-procedure vital signs reviewed and stable  Report to RN Given: handoff report given  Patient transferred to: PACU  Comments: VSS, report given to RN.    Handoff Report: Identifed the Patient, Identified the Reponsible Provider, Reviewed the pertinent medical history, Discussed the surgical course, Reviewed Intra-OP anesthesia mangement and issues during anesthesia, Set expectations for post-procedure period and Allowed opportunity for questions and acknowledgement of understanding      Vitals:  Vitals Value Taken Time   /69 12/19/23 1148   Temp     Pulse 72 12/19/23 1150   Resp 12    SpO2 99 % 12/19/23 1150   Vitals shown include unfiled device data.    Electronically Signed By: SONIA Sierra CRNA  December 19, 2023  11:51 AM

## 2023-12-19 NOTE — DISCHARGE INSTRUCTIONS
Post-Ablation Discharge Instructions    Care of groin site:        Remove the Band-Aid after 24 hours. If there is minor oozing, apply another Band-aid and remove it after 12 hours.         Do NOT take a bath, use a hot tub, pool, or submerse in water for at least 3 days You may shower.         It is normal to have a small bruise or lump at the site.        Do not scrub the site.        Do not use lotion or powder near the puncture site for 3 days.        For the first 2 days: Do not stoop or squat. When you cough, sneeze or move your bowels, hold your hand over the puncture site and press gently.        Do not lift more than 10 pounds or exertional activity for 10 days.      If you start bleeding from the site in your groin:  Lie down flat and press firmly on the site.  Call your physician immediately, or, come to the emergency room.    Call 911 right away if you have bleeding that is heavy or does not stop.     Call your doctor/provider if:        You have a large or growing hard lump around the site.        The site is red, swollen, hot or tender.        Blood or fluid is draining from the site.        You have chills or a fever greater than 101 F (38 C).        Your leg or arm turns bluish, feels numb or cool.        You have hives, a rash or unusual itching.     [facility]:   [address] [place] [address]. [place] [address]    Your Care Team:  [facility]   Telephone Number     Nurses:   Tamara SANTIAGO [phone]    After business hours: [phone], select option 4, and ask for EP Fellow on-call to be paged.   Non-procedure scheduling:    Sofi ADAMS   [phone]   Procedure scheduling:    Angy Cruz   [phone]   Device Clinic (Pacemakers, ICDs, Loop Recorders)    During business hours: [phone]  After business hours:   [phone]- select option 4 and ask for job code [id number].       As always, Thank you for trusting us with your health care needs

## 2023-12-20 ENCOUNTER — TELEPHONE (OUTPATIENT)
Dept: FAMILY MEDICINE | Facility: CLINIC | Age: 58
End: 2023-12-20
Payer: COMMERCIAL

## 2023-12-20 LAB
ATRIAL RATE - MUSE: 56 BPM
ATRIAL RATE - MUSE: 57 BPM
DIASTOLIC BLOOD PRESSURE - MUSE: NORMAL MMHG
DIASTOLIC BLOOD PRESSURE - MUSE: NORMAL MMHG
INTERPRETATION ECG - MUSE: NORMAL
INTERPRETATION ECG - MUSE: NORMAL
P AXIS - MUSE: 0 DEGREES
P AXIS - MUSE: 23 DEGREES
PR INTERVAL - MUSE: 168 MS
PR INTERVAL - MUSE: 172 MS
QRS DURATION - MUSE: 86 MS
QRS DURATION - MUSE: 90 MS
QT - MUSE: 418 MS
QT - MUSE: 428 MS
QTC - MUSE: 403 MS
QTC - MUSE: 416 MS
R AXIS - MUSE: 55 DEGREES
R AXIS - MUSE: 65 DEGREES
SYSTOLIC BLOOD PRESSURE - MUSE: NORMAL MMHG
SYSTOLIC BLOOD PRESSURE - MUSE: NORMAL MMHG
T AXIS - MUSE: 12 DEGREES
T AXIS - MUSE: 12 DEGREES
VENTRICULAR RATE- MUSE: 56 BPM
VENTRICULAR RATE- MUSE: 57 BPM

## 2023-12-20 NOTE — TELEPHONE ENCOUNTER
Patient Quality Outreach    Patient is due for the following:   Physical Preventive Adult Physical      Topic Date Due    Hepatitis B Vaccine (1 of 3 - 3-dose series) Never done    Pneumococcal Vaccine (1 of 2 - PCV) Never done    Zoster (Shingles) Vaccine (1 of 2) Never done    COVID-19 Vaccine (3 - Pfizer risk series) 09/22/2021    Diptheria Tetanus Pertussis (DTAP/TDAP/TD) Vaccine (2 - Td or Tdap) 03/06/2023    Flu Vaccine (1) 09/01/2023       Next Steps:   Schedule a Adult Preventative    Type of outreach:    Sent [employer] message.      Questions for provider review:    None           JANAE VERGARA MA

## 2024-01-24 DIAGNOSIS — I47.10 PAROXYSMAL SUPRAVENTRICULAR TACHYCARDIA (H): Primary | ICD-10-CM

## 2024-02-23 ENCOUNTER — ORDERS ONLY (AUTO-RELEASED) (OUTPATIENT)
Dept: CARDIOLOGY | Facility: CLINIC | Age: 59
End: 2024-02-23

## 2024-02-23 DIAGNOSIS — I47.10 PAROXYSMAL SUPRAVENTRICULAR TACHYCARDIA (H): ICD-10-CM

## 2024-04-01 ENCOUNTER — TELEPHONE (OUTPATIENT)
Dept: CARE COORDINATION | Facility: CLINIC | Age: 59
End: 2024-04-01

## 2024-04-01 NOTE — TELEPHONE ENCOUNTER
Called and LVM for patient to return call to clinic scheduler to reschedule virtual visit from today. Reminded patient he needs to be in [place] for virtual visit.     ROMA German

## 2024-04-01 NOTE — TELEPHONE ENCOUNTER
Patient needs to be rescheduled for their virtual visit due to Reason for Reschedule: Out-of-State Pt in [place] today, will wait for call from clinic to reschedule     Appointment mode: Video  Provider: Natali Castro MD

## 2024-04-03 NOTE — TELEPHONE ENCOUNTER
Left a message for patient to call back to schedule virtual appointment with Dr. Castro when he will be in [place], or an in person visit if he chooses.    AUTUMN Carmichael

## 2024-11-03 ENCOUNTER — HEALTH MAINTENANCE LETTER (OUTPATIENT)
Age: 59
End: 2024-11-03

## 2025-04-29 ENCOUNTER — TRANSFERRED RECORDS (OUTPATIENT)
Dept: HEALTH INFORMATION MANAGEMENT | Facility: CLINIC | Age: 60
End: 2025-04-29
Payer: COMMERCIAL

## 2025-05-12 SDOH — HEALTH STABILITY: PHYSICAL HEALTH: ON AVERAGE, HOW MANY MINUTES DO YOU ENGAGE IN EXERCISE AT THIS LEVEL?: 30 MIN

## 2025-05-12 SDOH — HEALTH STABILITY: PHYSICAL HEALTH: ON AVERAGE, HOW MANY DAYS PER WEEK DO YOU ENGAGE IN MODERATE TO STRENUOUS EXERCISE (LIKE A BRISK WALK)?: 3 DAYS

## 2025-05-12 ASSESSMENT — SOCIAL DETERMINANTS OF HEALTH (SDOH): HOW OFTEN DO YOU GET TOGETHER WITH FRIENDS OR RELATIVES?: TWICE A WEEK

## 2025-05-13 ENCOUNTER — OFFICE VISIT (OUTPATIENT)
Dept: FAMILY MEDICINE | Facility: CLINIC | Age: 60
End: 2025-05-13
Payer: COMMERCIAL

## 2025-05-13 VITALS
RESPIRATION RATE: 17 BRPM | DIASTOLIC BLOOD PRESSURE: 76 MMHG | HEART RATE: 72 BPM | TEMPERATURE: 97.3 F | OXYGEN SATURATION: 98 % | HEIGHT: 69 IN | WEIGHT: 212 LBS | BODY MASS INDEX: 31.4 KG/M2 | SYSTOLIC BLOOD PRESSURE: 131 MMHG

## 2025-05-13 DIAGNOSIS — Z12.11 SCREEN FOR COLON CANCER: ICD-10-CM

## 2025-05-13 DIAGNOSIS — E78.5 HYPERLIPIDEMIA, UNSPECIFIED HYPERLIPIDEMIA TYPE: ICD-10-CM

## 2025-05-13 DIAGNOSIS — Z00.00 ROUTINE GENERAL MEDICAL EXAMINATION AT A HEALTH CARE FACILITY: Primary | ICD-10-CM

## 2025-05-13 DIAGNOSIS — Z12.5 SCREENING FOR PROSTATE CANCER: ICD-10-CM

## 2025-05-13 LAB
BASOPHILS # BLD AUTO: 0 10E3/UL (ref 0–0.2)
BASOPHILS NFR BLD AUTO: 1 %
EOSINOPHIL # BLD AUTO: 0.1 10E3/UL (ref 0–0.7)
EOSINOPHIL NFR BLD AUTO: 2 %
ERYTHROCYTE [DISTWIDTH] IN BLOOD BY AUTOMATED COUNT: 12.8 % (ref 10–15)
EST. AVERAGE GLUCOSE BLD GHB EST-MCNC: 105 MG/DL
HBA1C MFR BLD: 5.3 % (ref 0–5.6)
HCT VFR BLD AUTO: 41.7 % (ref 40–53)
HGB BLD-MCNC: 14 G/DL (ref 13.3–17.7)
IMM GRANULOCYTES # BLD: 0 10E3/UL
IMM GRANULOCYTES NFR BLD: 0 %
LYMPHOCYTES # BLD AUTO: 1.5 10E3/UL (ref 0.8–5.3)
LYMPHOCYTES NFR BLD AUTO: 31 %
MCH RBC QN AUTO: 28.6 PG (ref 26.5–33)
MCHC RBC AUTO-ENTMCNC: 33.6 G/DL (ref 31.5–36.5)
MCV RBC AUTO: 85 FL (ref 78–100)
MONOCYTES # BLD AUTO: 0.3 10E3/UL (ref 0–1.3)
MONOCYTES NFR BLD AUTO: 7 %
NEUTROPHILS # BLD AUTO: 2.9 10E3/UL (ref 1.6–8.3)
NEUTROPHILS NFR BLD AUTO: 60 %
PLATELET # BLD AUTO: 215 10E3/UL (ref 150–450)
RBC # BLD AUTO: 4.9 10E6/UL (ref 4.4–5.9)
WBC # BLD AUTO: 4.8 10E3/UL (ref 4–11)

## 2025-05-13 PROCEDURE — 80061 LIPID PANEL: CPT | Performed by: FAMILY MEDICINE

## 2025-05-13 PROCEDURE — 3075F SYST BP GE 130 - 139MM HG: CPT | Performed by: FAMILY MEDICINE

## 2025-05-13 PROCEDURE — 85025 COMPLETE CBC W/AUTO DIFF WBC: CPT | Performed by: FAMILY MEDICINE

## 2025-05-13 PROCEDURE — 90471 IMMUNIZATION ADMIN: CPT | Performed by: FAMILY MEDICINE

## 2025-05-13 PROCEDURE — 1126F AMNT PAIN NOTED NONE PRSNT: CPT | Performed by: FAMILY MEDICINE

## 2025-05-13 PROCEDURE — 99396 PREV VISIT EST AGE 40-64: CPT | Mod: 25 | Performed by: FAMILY MEDICINE

## 2025-05-13 PROCEDURE — 3078F DIAST BP <80 MM HG: CPT | Performed by: FAMILY MEDICINE

## 2025-05-13 PROCEDURE — 90715 TDAP VACCINE 7 YRS/> IM: CPT | Performed by: FAMILY MEDICINE

## 2025-05-13 PROCEDURE — G0103 PSA SCREENING: HCPCS | Performed by: FAMILY MEDICINE

## 2025-05-13 PROCEDURE — 83036 HEMOGLOBIN GLYCOSYLATED A1C: CPT | Performed by: FAMILY MEDICINE

## 2025-05-13 PROCEDURE — 80053 COMPREHEN METABOLIC PANEL: CPT | Performed by: FAMILY MEDICINE

## 2025-05-13 PROCEDURE — 36415 COLL VENOUS BLD VENIPUNCTURE: CPT | Performed by: FAMILY MEDICINE

## 2025-05-13 ASSESSMENT — PAIN SCALES - GENERAL: PAINLEVEL_OUTOF10: NO PAIN (0)
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
## 2025-05-13 NOTE — PATIENT INSTRUCTIONS
Patient Education   Preventive Care Advice   This is general advice given by our system to help you stay healthy. However, your care team may have specific advice just for you. Please talk to your care team about your preventive care needs.  Nutrition  Eat 5 or more servings of fruits and vegetables each day.  Try wheat bread, brown rice and whole grain pasta (instead of white bread, rice, and pasta).  Get enough calcium and vitamin D. Check the label on foods and aim for 100% of the RDA (recommended daily allowance).  Lifestyle  Exercise at least 150 minutes each week  (30 minutes a day, 5 days a week).  Do muscle strengthening activities 2 days a week. These help control your weight and prevent disease.  No smoking.  Wear sunscreen to prevent skin cancer.  Have a dental exam and cleaning every 6 months.  Yearly exams  See your health care team every year to talk about:  Any changes in your health.  Any medicines your care team has prescribed.  Preventive care, family planning, and ways to prevent chronic diseases.  Shots (vaccines)   HPV shots (up to age 26), if you've never had them before.  Hepatitis B shots (up to age 59), if you've never had them before.  COVID-19 shot: Get this shot when it's due.  Flu shot: Get a flu shot every year.  Tetanus shot: Get a tetanus shot every 10 years.  Pneumococcal, hepatitis A, and RSV shots: Ask your care team if you need these based on your risk.  Shingles shot (for age 50 and up)  General health tests  Diabetes screening:  Starting at age 35, Get screened for diabetes at least every 3 years.  If you are younger than age 35, ask your care team if you should be screened for diabetes.  Cholesterol test: At age 39, start having a cholesterol test every 5 years, or more often if advised.  Bone density scan (DEXA): At age 50, ask your care team if you should have this scan for osteoporosis (brittle bones).  Hepatitis C: Get tested at least once in your life.  STIs (sexually  transmitted infections)  Before age 24: Ask your care team if you should be screened for STIs.  After age 24: Get screened for STIs if you're at risk. You are at risk for STIs (including HIV) if:  You are sexually active with more than one person.  You don't use condoms every time.  You or a partner was diagnosed with a sexually transmitted infection.  If you are at risk for HIV, ask about PrEP medicine to prevent HIV.  Get tested for HIV at least once in your life, whether you are at risk for HIV or not.  Cancer screening tests  Cervical cancer screening: If you have a cervix, begin getting regular cervical cancer screening tests starting at age 21.  Breast cancer scan (mammogram): If you've ever had breasts, begin having regular mammograms starting at age 40. This is a scan to check for breast cancer.  Colon cancer screening: It is important to start screening for colon cancer at age 45.  Have a colonoscopy test every 10 years (or more often if you're at risk) Or, ask your provider about stool tests like a FIT test every year or Cologuard test every 3 years.  To learn more about your testing options, visit:   .  For help making a decision, visit:   https://bit.ly/om28236.  Prostate cancer screening test: If you have a prostate, ask your care team if a prostate cancer screening test (PSA) at age 55 is right for you.  Lung cancer screening: If you are a current or former smoker ages 50 to 80, ask your care team if ongoing lung cancer screenings are right for you.  For informational purposes only. Not to replace the advice of your health care provider. Copyright   2023 [employer]. All rights reserved. Clinically reviewed by the [facility]. [employer] 491920 - REV 01/24.

## 2025-05-14 LAB
ALBUMIN SERPL BCG-MCNC: 4.4 G/DL (ref 3.5–5.2)
ALP SERPL-CCNC: 60 U/L (ref 40–150)
ALT SERPL W P-5'-P-CCNC: 23 U/L (ref 0–70)
ANION GAP SERPL CALCULATED.3IONS-SCNC: 9 MMOL/L (ref 7–15)
AST SERPL W P-5'-P-CCNC: 20 U/L (ref 0–45)
BILIRUB SERPL-MCNC: 0.4 MG/DL
BUN SERPL-MCNC: 16.4 MG/DL (ref 8–23)
CALCIUM SERPL-MCNC: 9.5 MG/DL (ref 8.8–10.4)
CHLORIDE SERPL-SCNC: 105 MMOL/L (ref 98–107)
CHOLEST SERPL-MCNC: 194 MG/DL
CREAT SERPL-MCNC: 1.06 MG/DL (ref 0.67–1.17)
EGFRCR SERPLBLD CKD-EPI 2021: 80 ML/MIN/1.73M2
FASTING STATUS PATIENT QL REPORTED: YES
FASTING STATUS PATIENT QL REPORTED: YES
GLUCOSE SERPL-MCNC: 95 MG/DL (ref 70–99)
HCO3 SERPL-SCNC: 27 MMOL/L (ref 22–29)
HDLC SERPL-MCNC: 54 MG/DL
LDLC SERPL CALC-MCNC: 129 MG/DL
NONHDLC SERPL-MCNC: 140 MG/DL
POTASSIUM SERPL-SCNC: 4.9 MMOL/L (ref 3.4–5.3)
PROT SERPL-MCNC: 7.2 G/DL (ref 6.4–8.3)
PSA SERPL DL<=0.01 NG/ML-MCNC: 0.95 NG/ML (ref 0–4.5)
SODIUM SERPL-SCNC: 141 MMOL/L (ref 135–145)
TRIGL SERPL-MCNC: 57 MG/DL

## (undated) DEVICE — GW VASC .035IN DIA 260CML 7CML 3 MM RADIUS J CURVE 502455

## (undated) DEVICE — CATH THERMOCOOL SMARTTOUCH SF DF CURVE

## (undated) DEVICE — TUBING PRESSURE 30"

## (undated) DEVICE — INTRO SHEATH 6FRX10CM PINNACLE RSS602

## (undated) DEVICE — FASTENER CATH BALLOON CLAMPX2 STATLOCK 0684-00-493

## (undated) DEVICE — REPRO DAIG RSPN FX CRV DX EP CATH 6F

## (undated) DEVICE — REPRO BARD EP XT DECAPL STRBL DX EP CATH 6F

## (undated) DEVICE — PATCH CARTO 3 EXTERNAL REFERENCE 3D MAPPING CREFP6

## (undated) DEVICE — INTRODUCER SHEATH FAST-CATH CATH-LOCK 7FRX12CM 406702

## (undated) DEVICE — CATH EP REPRO DAIG SPRM FX CRV DX EP C

## (undated) DEVICE — KIT VENOUS FLUSH 60210177

## (undated) DEVICE — INTRO GLIDESHEATH SLENDER 6FR 10X45CM 60-1060

## (undated) DEVICE — ELECTRODE DEFIB CADENCE 22550R

## (undated) DEVICE — SLEEVE TR BAND RADIAL COMPRESSION DEVICE 24CM TRB24-REG

## (undated) DEVICE — CATH ANGIO 6FR JL3.5 100CM ST+

## (undated) DEVICE — KIT HAND CONTROL ACIST 016795

## (undated) DEVICE — INTRODUCER SHEATH FAST-CATH CATH-LOCK 6FRX12CM 406701

## (undated) DEVICE — INTRO CATH 12CM 8.5FR FST-CATH

## (undated) DEVICE — PACK HEART LEFT CUSTOM

## (undated) DEVICE — KIT MICROINTRODUCER VASCULAR VSI 4FRX0.018INX40CM 7195X

## (undated) DEVICE — Device

## (undated) DEVICE — MANIFOLD KIT ANGIO AUTOMATED 014613

## (undated) DEVICE — TUBE SET SMARKABLATE IRRIGATION

## (undated) RX ORDER — FENTANYL CITRATE 50 UG/ML
INJECTION, SOLUTION INTRAMUSCULAR; INTRAVENOUS
Status: DISPENSED
Start: 2023-02-06

## (undated) RX ORDER — NICARDIPINE HCL-0.9% SOD CHLOR 1 MG/10 ML
SYRINGE (ML) INTRAVENOUS
Status: DISPENSED
Start: 2023-02-06

## (undated) RX ORDER — ONDANSETRON 2 MG/ML
INJECTION INTRAMUSCULAR; INTRAVENOUS
Status: DISPENSED
Start: 2023-12-19

## (undated) RX ORDER — FENTANYL CITRATE 50 UG/ML
INJECTION, SOLUTION INTRAMUSCULAR; INTRAVENOUS
Status: DISPENSED
Start: 2023-12-19

## (undated) RX ORDER — LIDOCAINE HYDROCHLORIDE 10 MG/ML
INJECTION, SOLUTION EPIDURAL; INFILTRATION; INTRACAUDAL; PERINEURAL
Status: DISPENSED
Start: 2023-02-06

## (undated) RX ORDER — LIDOCAINE HYDROCHLORIDE 10 MG/ML
INJECTION, SOLUTION EPIDURAL; INFILTRATION; INTRACAUDAL; PERINEURAL
Status: DISPENSED
Start: 2023-12-19

## (undated) RX ORDER — NITROGLYCERIN 5 MG/ML
VIAL (ML) INTRAVENOUS
Status: DISPENSED
Start: 2023-02-06

## (undated) RX ORDER — EPHEDRINE SULFATE 50 MG/ML
INJECTION, SOLUTION INTRAMUSCULAR; INTRAVENOUS; SUBCUTANEOUS
Status: DISPENSED
Start: 2023-12-19

## (undated) RX ORDER — ASPIRIN 325 MG
TABLET ORAL
Status: DISPENSED
Start: 2023-02-06

## (undated) RX ORDER — HEPARIN SODIUM 1000 [USP'U]/ML
INJECTION, SOLUTION INTRAVENOUS; SUBCUTANEOUS
Status: DISPENSED
Start: 2023-12-19

## (undated) RX ORDER — BUPIVACAINE HYDROCHLORIDE 5 MG/ML
INJECTION, SOLUTION EPIDURAL; INTRACAUDAL
Status: DISPENSED
Start: 2023-12-19

## (undated) RX ORDER — HEPARIN SODIUM 1000 [USP'U]/ML
INJECTION, SOLUTION INTRAVENOUS; SUBCUTANEOUS
Status: DISPENSED
Start: 2023-02-06